# Patient Record
Sex: FEMALE | Race: BLACK OR AFRICAN AMERICAN | NOT HISPANIC OR LATINO | URBAN - METROPOLITAN AREA
[De-identification: names, ages, dates, MRNs, and addresses within clinical notes are randomized per-mention and may not be internally consistent; named-entity substitution may affect disease eponyms.]

---

## 2018-11-06 ENCOUNTER — INPATIENT (INPATIENT)
Facility: HOSPITAL | Age: 58
LOS: 10 days | Discharge: HOME | End: 2018-11-17
Attending: SURGERY | Admitting: SURGERY
Payer: MEDICAID

## 2018-11-06 VITALS
RESPIRATION RATE: 20 BRPM | OXYGEN SATURATION: 97 % | WEIGHT: 149.91 LBS | SYSTOLIC BLOOD PRESSURE: 144 MMHG | HEART RATE: 118 BPM | HEIGHT: 63 IN | TEMPERATURE: 99 F | DIASTOLIC BLOOD PRESSURE: 87 MMHG

## 2018-11-06 DIAGNOSIS — E78.5 HYPERLIPIDEMIA, UNSPECIFIED: ICD-10-CM

## 2018-11-06 DIAGNOSIS — I82.5Z2 CHRONIC EMBOLISM AND THROMBOSIS OF UNSPECIFIED DEEP VEINS OF LEFT DISTAL LOWER EXTREMITY: ICD-10-CM

## 2018-11-06 DIAGNOSIS — I10 ESSENTIAL (PRIMARY) HYPERTENSION: ICD-10-CM

## 2018-11-06 DIAGNOSIS — D72.828 OTHER ELEVATED WHITE BLOOD CELL COUNT: ICD-10-CM

## 2018-11-06 DIAGNOSIS — K56.609 UNSPECIFIED INTESTINAL OBSTRUCTION, UNSPECIFIED AS TO PARTIAL VERSUS COMPLETE OBSTRUCTION: ICD-10-CM

## 2018-11-06 DIAGNOSIS — R09.89 OTHER SPECIFIED SYMPTOMS AND SIGNS INVOLVING THE CIRCULATORY AND RESPIRATORY SYSTEMS: ICD-10-CM

## 2018-11-06 DIAGNOSIS — N17.9 ACUTE KIDNEY FAILURE, UNSPECIFIED: ICD-10-CM

## 2018-11-06 DIAGNOSIS — Z98.891 HISTORY OF UTERINE SCAR FROM PREVIOUS SURGERY: Chronic | ICD-10-CM

## 2018-11-06 LAB
ALBUMIN SERPL ELPH-MCNC: 3.6 G/DL — SIGNIFICANT CHANGE UP (ref 3.3–5)
ALP SERPL-CCNC: 80 U/L — SIGNIFICANT CHANGE UP (ref 40–120)
ALT FLD-CCNC: 21 U/L — SIGNIFICANT CHANGE UP (ref 12–78)
AMYLASE P1 CFR SERPL: 241 U/L — HIGH (ref 25–115)
ANION GAP SERPL CALC-SCNC: 15 MMOL/L — SIGNIFICANT CHANGE UP (ref 5–17)
APTT BLD: 27.8 SEC — LOW (ref 28.5–37)
AST SERPL-CCNC: 26 U/L — SIGNIFICANT CHANGE UP (ref 15–37)
BASOPHILS # BLD AUTO: 0.02 K/UL — SIGNIFICANT CHANGE UP (ref 0–0.2)
BASOPHILS NFR BLD AUTO: 0.2 % — SIGNIFICANT CHANGE UP (ref 0–2)
BILIRUB SERPL-MCNC: 0.8 MG/DL — SIGNIFICANT CHANGE UP (ref 0.2–1.2)
BUN SERPL-MCNC: 54 MG/DL — HIGH (ref 7–23)
CALCIUM SERPL-MCNC: 10 MG/DL — SIGNIFICANT CHANGE UP (ref 8.5–10.1)
CHLORIDE SERPL-SCNC: 79 MMOL/L — LOW (ref 96–108)
CO2 SERPL-SCNC: 36 MMOL/L — HIGH (ref 22–31)
CREAT SERPL-MCNC: 1.67 MG/DL — HIGH (ref 0.5–1.3)
EOSINOPHIL # BLD AUTO: 0.01 K/UL — SIGNIFICANT CHANGE UP (ref 0–0.5)
EOSINOPHIL NFR BLD AUTO: 0.1 % — SIGNIFICANT CHANGE UP (ref 0–6)
GLUCOSE SERPL-MCNC: 211 MG/DL — HIGH (ref 70–99)
HCG SERPL-ACNC: 2 MIU/ML — SIGNIFICANT CHANGE UP
HCT VFR BLD CALC: 44.1 % — SIGNIFICANT CHANGE UP (ref 34.5–45)
HGB BLD-MCNC: 14.6 G/DL — SIGNIFICANT CHANGE UP (ref 11.5–15.5)
IMM GRANULOCYTES NFR BLD AUTO: 0.3 % — SIGNIFICANT CHANGE UP (ref 0–1.5)
INR BLD: 1.22 RATIO — HIGH (ref 0.88–1.16)
LACTATE SERPL-SCNC: 1.3 MMOL/L — SIGNIFICANT CHANGE UP (ref 0.7–2)
LIDOCAIN IGE QN: 162 U/L — SIGNIFICANT CHANGE UP (ref 73–393)
LYMPHOCYTES # BLD AUTO: 1.41 K/UL — SIGNIFICANT CHANGE UP (ref 1–3.3)
LYMPHOCYTES # BLD AUTO: 11.8 % — LOW (ref 13–44)
MCHC RBC-ENTMCNC: 28.5 PG — SIGNIFICANT CHANGE UP (ref 27–34)
MCHC RBC-ENTMCNC: 33.1 GM/DL — SIGNIFICANT CHANGE UP (ref 32–36)
MCV RBC AUTO: 86.1 FL — SIGNIFICANT CHANGE UP (ref 80–100)
MONOCYTES # BLD AUTO: 0.82 K/UL — SIGNIFICANT CHANGE UP (ref 0–0.9)
MONOCYTES NFR BLD AUTO: 6.9 % — SIGNIFICANT CHANGE UP (ref 2–14)
NEUTROPHILS # BLD AUTO: 9.65 K/UL — HIGH (ref 1.8–7.4)
NEUTROPHILS NFR BLD AUTO: 80.7 % — HIGH (ref 43–77)
NRBC # BLD: 0 /100 WBCS — SIGNIFICANT CHANGE UP (ref 0–0)
PLATELET # BLD AUTO: 364 K/UL — SIGNIFICANT CHANGE UP (ref 150–400)
POTASSIUM SERPL-MCNC: 3.7 MMOL/L — SIGNIFICANT CHANGE UP (ref 3.5–5.3)
POTASSIUM SERPL-SCNC: 3.7 MMOL/L — SIGNIFICANT CHANGE UP (ref 3.5–5.3)
PROT SERPL-MCNC: 9 GM/DL — HIGH (ref 6–8.3)
PROTHROM AB SERPL-ACNC: 13.7 SEC — HIGH (ref 10–12.9)
RBC # BLD: 5.12 M/UL — SIGNIFICANT CHANGE UP (ref 3.8–5.2)
RBC # FLD: 13.6 % — SIGNIFICANT CHANGE UP (ref 10.3–14.5)
SODIUM SERPL-SCNC: 130 MMOL/L — LOW (ref 135–145)
WBC # BLD: 11.95 K/UL — HIGH (ref 3.8–10.5)
WBC # FLD AUTO: 11.95 K/UL — HIGH (ref 3.8–10.5)

## 2018-11-06 PROCEDURE — 74176 CT ABD & PELVIS W/O CONTRAST: CPT | Mod: 26

## 2018-11-06 PROCEDURE — 99285 EMERGENCY DEPT VISIT HI MDM: CPT | Mod: 25

## 2018-11-06 PROCEDURE — 93010 ELECTROCARDIOGRAM REPORT: CPT

## 2018-11-06 PROCEDURE — 71045 X-RAY EXAM CHEST 1 VIEW: CPT | Mod: 26

## 2018-11-06 PROCEDURE — 74018 RADEX ABDOMEN 1 VIEW: CPT | Mod: 26

## 2018-11-06 PROCEDURE — 43753 TX GASTRO INTUB W/ASP: CPT

## 2018-11-06 RX ORDER — MORPHINE SULFATE 50 MG/1
4 CAPSULE, EXTENDED RELEASE ORAL
Qty: 0 | Refills: 0 | Status: DISCONTINUED | OUTPATIENT
Start: 2018-11-06 | End: 2018-11-08

## 2018-11-06 RX ORDER — MORPHINE SULFATE 50 MG/1
2 CAPSULE, EXTENDED RELEASE ORAL EVERY 4 HOURS
Qty: 0 | Refills: 0 | Status: DISCONTINUED | OUTPATIENT
Start: 2018-11-06 | End: 2018-11-08

## 2018-11-06 RX ORDER — PIPERACILLIN AND TAZOBACTAM 4; .5 G/20ML; G/20ML
3.38 INJECTION, POWDER, LYOPHILIZED, FOR SOLUTION INTRAVENOUS EVERY 8 HOURS
Qty: 0 | Refills: 0 | Status: DISCONTINUED | OUTPATIENT
Start: 2018-11-06 | End: 2018-11-08

## 2018-11-06 RX ORDER — MORPHINE SULFATE 50 MG/1
4 CAPSULE, EXTENDED RELEASE ORAL ONCE
Qty: 0 | Refills: 0 | Status: DISCONTINUED | OUTPATIENT
Start: 2018-11-06 | End: 2018-11-06

## 2018-11-06 RX ORDER — PANTOPRAZOLE SODIUM 20 MG/1
40 TABLET, DELAYED RELEASE ORAL DAILY
Qty: 0 | Refills: 0 | Status: DISCONTINUED | OUTPATIENT
Start: 2018-11-06 | End: 2018-11-08

## 2018-11-06 RX ORDER — SODIUM CHLORIDE 9 MG/ML
1000 INJECTION INTRAMUSCULAR; INTRAVENOUS; SUBCUTANEOUS ONCE
Qty: 0 | Refills: 0 | Status: COMPLETED | OUTPATIENT
Start: 2018-11-06 | End: 2018-11-06

## 2018-11-06 RX ORDER — IOHEXOL 300 MG/ML
30 INJECTION, SOLUTION INTRAVENOUS ONCE
Qty: 0 | Refills: 0 | Status: COMPLETED | OUTPATIENT
Start: 2018-11-06 | End: 2018-11-06

## 2018-11-06 RX ORDER — AMLODIPINE BESYLATE 2.5 MG/1
10 TABLET ORAL DAILY
Qty: 0 | Refills: 0 | Status: DISCONTINUED | OUTPATIENT
Start: 2018-11-06 | End: 2018-11-08

## 2018-11-06 RX ORDER — PANTOPRAZOLE SODIUM 20 MG/1
40 TABLET, DELAYED RELEASE ORAL ONCE
Qty: 0 | Refills: 0 | Status: COMPLETED | OUTPATIENT
Start: 2018-11-06 | End: 2018-11-06

## 2018-11-06 RX ORDER — SODIUM CHLORIDE 9 MG/ML
1000 INJECTION, SOLUTION INTRAVENOUS
Qty: 0 | Refills: 0 | Status: DISCONTINUED | OUTPATIENT
Start: 2018-11-06 | End: 2018-11-07

## 2018-11-06 RX ORDER — AMLODIPINE BESYLATE 2.5 MG/1
1 TABLET ORAL
Qty: 0 | Refills: 0 | COMMUNITY

## 2018-11-06 RX ORDER — PIPERACILLIN AND TAZOBACTAM 4; .5 G/20ML; G/20ML
3.38 INJECTION, POWDER, LYOPHILIZED, FOR SOLUTION INTRAVENOUS ONCE
Qty: 0 | Refills: 0 | Status: COMPLETED | OUTPATIENT
Start: 2018-11-06 | End: 2018-11-06

## 2018-11-06 RX ORDER — ONDANSETRON 8 MG/1
4 TABLET, FILM COATED ORAL ONCE
Qty: 0 | Refills: 0 | Status: COMPLETED | OUTPATIENT
Start: 2018-11-06 | End: 2018-11-06

## 2018-11-06 RX ORDER — ACETAMINOPHEN 500 MG
650 TABLET ORAL EVERY 6 HOURS
Qty: 0 | Refills: 0 | Status: DISCONTINUED | OUTPATIENT
Start: 2018-11-06 | End: 2018-11-08

## 2018-11-06 RX ORDER — ACETAMINOPHEN 500 MG
1000 TABLET ORAL ONCE
Qty: 0 | Refills: 0 | Status: DISCONTINUED | OUTPATIENT
Start: 2018-11-06 | End: 2018-11-08

## 2018-11-06 RX ADMIN — IOHEXOL 30 MILLILITER(S): 300 INJECTION, SOLUTION INTRAVENOUS at 11:19

## 2018-11-06 RX ADMIN — SODIUM CHLORIDE 1000 MILLILITER(S): 9 INJECTION INTRAMUSCULAR; INTRAVENOUS; SUBCUTANEOUS at 11:18

## 2018-11-06 RX ADMIN — PANTOPRAZOLE SODIUM 40 MILLIGRAM(S): 20 TABLET, DELAYED RELEASE ORAL at 11:18

## 2018-11-06 RX ADMIN — PIPERACILLIN AND TAZOBACTAM 25 GRAM(S): 4; .5 INJECTION, POWDER, LYOPHILIZED, FOR SOLUTION INTRAVENOUS at 21:42

## 2018-11-06 RX ADMIN — MORPHINE SULFATE 4 MILLIGRAM(S): 50 CAPSULE, EXTENDED RELEASE ORAL at 11:45

## 2018-11-06 RX ADMIN — PIPERACILLIN AND TAZOBACTAM 200 GRAM(S): 4; .5 INJECTION, POWDER, LYOPHILIZED, FOR SOLUTION INTRAVENOUS at 18:30

## 2018-11-06 RX ADMIN — SODIUM CHLORIDE 1000 MILLILITER(S): 9 INJECTION INTRAMUSCULAR; INTRAVENOUS; SUBCUTANEOUS at 16:24

## 2018-11-06 RX ADMIN — MORPHINE SULFATE 4 MILLIGRAM(S): 50 CAPSULE, EXTENDED RELEASE ORAL at 11:18

## 2018-11-06 RX ADMIN — ONDANSETRON 4 MILLIGRAM(S): 8 TABLET, FILM COATED ORAL at 11:18

## 2018-11-06 RX ADMIN — SODIUM CHLORIDE 100 MILLILITER(S): 9 INJECTION, SOLUTION INTRAVENOUS at 18:30

## 2018-11-06 NOTE — H&P ADULT - HISTORY OF PRESENT ILLNESS
59YO female presents c/o worsening abdominal pain over the last 4-5 days. This has been accompanied with an increase in the size of her abdomen. Pain has been to 4-5/10 in intensity. She said she has vomited "multiple times". She said the fluid was green.    H/O  27 years ago.    No similar episodes in the past.    Pt is in the  on an extended vacation from [place].

## 2018-11-06 NOTE — H&P ADULT - ASSESSMENT
57 yo female admitted with:  SBO  DM  H/O DVT L LE  H/O HTN  Leukocytosis  BEBE from persistent vomiting  lactate level?

## 2018-11-06 NOTE — H&P ADULT - NSHPPHYSICALEXAM_GEN_ALL_CORE
Head normocephalic. Wearing a very big "weave"  ENT WNL  Cardiac nl regular rhythm, no murmur  Lungs clear  Abd  moderately tensely distended. increased tenderness suprapubically in the midline. No palpable masses or hernias. Surgical scar appears well healed. No defects noted.   deferred  GI WNL  Extremities : no tenderness or swelling in left LE [site of prior DVT]

## 2018-11-06 NOTE — PATIENT PROFILE ADULT - DOES PATIENT HAVE ADVANCE DIRECTIVE
[birth year]        [address]  [address]      Dear Jaime Hamilton,    [facility] records indicate that you have outstanding lab work and or testing that was ordered for you and has not yet been completed:  Orders Placed This Encounter
no

## 2018-11-06 NOTE — H&P ADULT - NSHPLABSRESULTS_GEN_ALL_CORE
14.6   11.95 )-----------( 364      ( 06 Nov 2018 11:26 )             44.1   11-06    130<L>  |  79<L>  |  54<H>  ----------------------------<  211<H>  3.7   |  36<H>  |  1.67<H>    Ca    10.0      06 Nov 2018 11:26      TPro  9.0<H>  /  Alb  3.6  /  TBili  0.8  /  DBili  x   /  AST  26  /  ALT  21  /  AlkPhos  80  11-06  PT/INR - ( 06 Nov 2018 11:26 )   PT: 13.7 sec;   INR: 1.22 ratio    PTT - ( 06 Nov 2018 11:26 )  PTT:27.8 sec    Amylase, Serum Total (11.06.18 @ 11:26)    Amylase, Serum Total: 241 U/L      < from: CT Abdomen and Pelvis w/ Oral Cont (11.06.18 @ 12:54) >    EXAM:  CT ABDOMEN AND PELVIS OC                            PROCEDURE DATE:  11/06/2018          INTERPRETATION:  CT of the abdomen and pelvis without IV contrast    Indication: Left upper and lower abdominal pain, nausea and vomiting.    Technique:Axial multidetector CT images of the abdomen are acquired   following the administration of oral contrast only.    Comparison: None.    Findings:    Limited sections through the lung bases demonstrate apparent mild mural   thickening of the distal esophagus. Mild right basilar atelectasis. Mild   airspace opacification versus atelectasis in the left lower lobe. Small   atelectasis in the lingula.    Examination of the abdomen and pelvis is limited by lack of IV contrast.   Hepatic steatosis. Allowing for the non-IV contrast technique, the   pancreas, spleen, adrenals and kidneys appear grossly unremarkable. No   evidence for a calculus in the kidneys or ureters. No hydronephrosis. The   common bile duct is nondilated.    The appendix is not visualized. There is dilatation of the small bowel.   The terminal ileum and colon are collapsed. Findings are suggestive of a   small bowel obstruction. There is a mild mesenteric ascites for which   associated small bowel ischemia cannot be excluded.    No evidence for free air, or enlarged lymph node.    The urinary bladder and uterus appear grossly unremarkable.    Impression: Findings are suggestive of small bowel obstruction. Mild   mesenteric ascites for which associated small bowel ischemiacannot be   excluded.    Mild airspace opacification versus atelectasis in the left lower lobe.    Apparent mild distal esophageal mural thickening. EGD may be pursued for   further evaluation.    Hepatic steatosis.

## 2018-11-06 NOTE — ED ADULT NURSE NOTE - NSIMPLEMENTINTERV_GEN_ALL_ED
Implemented All Universal Safety Interventions:  [place] to call system. Call bell, personal items and telephone within reach. Instruct patient to call for assistance. Room bathroom lighting operational. Non-slip footwear when patient is off stretcher. Physically safe environment: no spills, clutter or unnecessary equipment. Stretcher in lowest position, wheels locked, appropriate side rails in place.

## 2018-11-06 NOTE — ED PROVIDER NOTE - OBJECTIVE STATEMENT
58 years old female by ems c/o 4 days of epigastric and left lower abd pain nausea, vomiting. Pt denies headache, dizziness, blurred visions, light sensitivities, focal/distal weakness or numbness, cough, sob, chest pain, fever, chills, dysuria or irregular bowel movements. Pt sts she takes coumadin for hx of left leg dvt for 10 years. 58 years old female by ems c/o 4 days of epigastric and left lower abd pain nausea, vomiting and unable to tolerate orally and take her medications. Pt denies headache, dizziness, blurred visions, light sensitivities, focal/distal weakness or numbness, cough, sob, chest pain, fever, chills, dysuria or irregular bowel movements. Pt sts she takes coumadin for hx of left leg dvt for 10 years. Pt sts she has a hx of .

## 2018-11-06 NOTE — ED ADULT NURSE NOTE - OBJECTIVE STATEMENT
Pt c/o of diffused abdominal apin, nause, vomiting and weakness  since last friday. Denies any diarrhea

## 2018-11-06 NOTE — ED PROVIDER NOTE - EYES, MLM
Clear bilaterally, pupils equal, round and reactive to light. No photophobia no sclera icterus bilaterally

## 2018-11-06 NOTE — H&P ADULT - NSHPSOCIALHISTORY_GEN_ALL_CORE
Single  Denies use of cigarettes, alcohol & recreational drugs  Visitor from [place].  Multiple sexual partners.

## 2018-11-06 NOTE — ED PROVIDER NOTE - CONSTITUTIONAL, MLM
normal... Well appearing, well nourished, awake, alert, oriented to person, place, time/situation and in no apparent distress. Speaking in clear full sentences no nasal flaring no shoulders retractions not holding her chest/abdomen, appears very comfortable

## 2018-11-06 NOTE — H&P ADULT - NSHPREVIEWOFSYSTEMS_GEN_ALL_CORE
H/O of DVT in left leg approximately 5 years ago. Has been on Coumadin since. Has not had any Coumadin since vomiting started.    See CC HPI

## 2018-11-06 NOTE — ED ADULT TRIAGE NOTE - CHIEF COMPLAINT QUOTE
ems states, " abdominal pain , vomiting , weakness, and not taking bp meds for 5 days, radiating to the back  "

## 2018-11-07 DIAGNOSIS — K22.9 DISEASE OF ESOPHAGUS, UNSPECIFIED: ICD-10-CM

## 2018-11-07 LAB
ALBUMIN SERPL ELPH-MCNC: 2.9 G/DL — LOW (ref 3.3–5)
ALP SERPL-CCNC: 65 U/L — SIGNIFICANT CHANGE UP (ref 40–120)
ALT FLD-CCNC: 18 U/L — SIGNIFICANT CHANGE UP (ref 12–78)
AMYLASE P1 CFR SERPL: 114 U/L — SIGNIFICANT CHANGE UP (ref 25–115)
ANION GAP SERPL CALC-SCNC: 10 MMOL/L — SIGNIFICANT CHANGE UP (ref 5–17)
APPEARANCE UR: ABNORMAL
AST SERPL-CCNC: 13 U/L — LOW (ref 15–37)
BACTERIA # UR AUTO: ABNORMAL
BILIRUB SERPL-MCNC: 0.8 MG/DL — SIGNIFICANT CHANGE UP (ref 0.2–1.2)
BILIRUB UR-MCNC: NEGATIVE — SIGNIFICANT CHANGE UP
BUN SERPL-MCNC: 25 MG/DL — HIGH (ref 7–23)
CALCIUM SERPL-MCNC: 8.7 MG/DL — SIGNIFICANT CHANGE UP (ref 8.5–10.1)
CHLORIDE SERPL-SCNC: 88 MMOL/L — LOW (ref 96–108)
CO2 SERPL-SCNC: 37 MMOL/L — HIGH (ref 22–31)
COLOR SPEC: YELLOW — SIGNIFICANT CHANGE UP
COMMENT - URINE: SIGNIFICANT CHANGE UP
CREAT SERPL-MCNC: 1.02 MG/DL — SIGNIFICANT CHANGE UP (ref 0.5–1.3)
DIFF PNL FLD: ABNORMAL
EPI CELLS # UR: ABNORMAL
GLUCOSE SERPL-MCNC: 141 MG/DL — HIGH (ref 70–99)
GLUCOSE UR QL: NEGATIVE MG/DL — SIGNIFICANT CHANGE UP
HCT VFR BLD CALC: 37.3 % — SIGNIFICANT CHANGE UP (ref 34.5–45)
HGB BLD-MCNC: 12.1 G/DL — SIGNIFICANT CHANGE UP (ref 11.5–15.5)
KETONES UR-MCNC: ABNORMAL
LEUKOCYTE ESTERASE UR-ACNC: NEGATIVE — SIGNIFICANT CHANGE UP
MAGNESIUM SERPL-MCNC: 3 MG/DL — HIGH (ref 1.6–2.6)
MCHC RBC-ENTMCNC: 28.9 PG — SIGNIFICANT CHANGE UP (ref 27–34)
MCHC RBC-ENTMCNC: 32.4 GM/DL — SIGNIFICANT CHANGE UP (ref 32–36)
MCV RBC AUTO: 89.2 FL — SIGNIFICANT CHANGE UP (ref 80–100)
NITRITE UR-MCNC: NEGATIVE — SIGNIFICANT CHANGE UP
NRBC # BLD: 0 /100 WBCS — SIGNIFICANT CHANGE UP (ref 0–0)
PH UR: 6 — SIGNIFICANT CHANGE UP (ref 5–8)
PHOSPHATE SERPL-MCNC: 2.6 MG/DL — SIGNIFICANT CHANGE UP (ref 2.5–4.5)
PLATELET # BLD AUTO: 295 K/UL — SIGNIFICANT CHANGE UP (ref 150–400)
POTASSIUM SERPL-MCNC: 3.1 MMOL/L — LOW (ref 3.5–5.3)
POTASSIUM SERPL-SCNC: 3.1 MMOL/L — LOW (ref 3.5–5.3)
PROT SERPL-MCNC: 7 GM/DL — SIGNIFICANT CHANGE UP (ref 6–8.3)
PROT UR-MCNC: 30 MG/DL
RBC # BLD: 4.18 M/UL — SIGNIFICANT CHANGE UP (ref 3.8–5.2)
RBC # FLD: 13.9 % — SIGNIFICANT CHANGE UP (ref 10.3–14.5)
RBC CASTS # UR COMP ASSIST: ABNORMAL /HPF (ref 0–4)
SODIUM SERPL-SCNC: 135 MMOL/L — SIGNIFICANT CHANGE UP (ref 135–145)
SP GR SPEC: 1.01 — SIGNIFICANT CHANGE UP (ref 1.01–1.02)
UROBILINOGEN FLD QL: NEGATIVE MG/DL — SIGNIFICANT CHANGE UP
WBC # BLD: 9.86 K/UL — SIGNIFICANT CHANGE UP (ref 3.8–10.5)
WBC # FLD AUTO: 9.86 K/UL — SIGNIFICANT CHANGE UP (ref 3.8–10.5)
WBC UR QL: SIGNIFICANT CHANGE UP

## 2018-11-07 PROCEDURE — 74019 RADEX ABDOMEN 2 VIEWS: CPT | Mod: 26

## 2018-11-07 RX ORDER — POTASSIUM CHLORIDE 20 MEQ
10 PACKET (EA) ORAL
Qty: 0 | Refills: 0 | Status: COMPLETED | OUTPATIENT
Start: 2018-11-07 | End: 2018-11-07

## 2018-11-07 RX ORDER — POTASSIUM CHLORIDE 20 MEQ
10 PACKET (EA) ORAL ONCE
Qty: 0 | Refills: 0 | Status: DISCONTINUED | OUTPATIENT
Start: 2018-11-07 | End: 2018-11-07

## 2018-11-07 RX ORDER — HEPARIN SODIUM 5000 [USP'U]/ML
5000 INJECTION INTRAVENOUS; SUBCUTANEOUS EVERY 8 HOURS
Qty: 0 | Refills: 0 | Status: DISCONTINUED | OUTPATIENT
Start: 2018-11-07 | End: 2018-11-08

## 2018-11-07 RX ORDER — METOPROLOL TARTRATE 50 MG
5 TABLET ORAL EVERY 6 HOURS
Qty: 0 | Refills: 0 | Status: DISCONTINUED | OUTPATIENT
Start: 2018-11-07 | End: 2018-11-08

## 2018-11-07 RX ORDER — DEXTROSE MONOHYDRATE, SODIUM CHLORIDE, AND POTASSIUM CHLORIDE 50; .745; 4.5 G/1000ML; G/1000ML; G/1000ML
1000 INJECTION, SOLUTION INTRAVENOUS
Qty: 0 | Refills: 0 | Status: DISCONTINUED | OUTPATIENT
Start: 2018-11-07 | End: 2018-11-08

## 2018-11-07 RX ORDER — ONDANSETRON 8 MG/1
4 TABLET, FILM COATED ORAL EVERY 8 HOURS
Qty: 0 | Refills: 0 | Status: DISCONTINUED | OUTPATIENT
Start: 2018-11-07 | End: 2018-11-08

## 2018-11-07 RX ADMIN — AMLODIPINE BESYLATE 10 MILLIGRAM(S): 2.5 TABLET ORAL at 05:26

## 2018-11-07 RX ADMIN — Medication 100 MILLIEQUIVALENT(S): at 12:51

## 2018-11-07 RX ADMIN — HEPARIN SODIUM 5000 UNIT(S): 5000 INJECTION INTRAVENOUS; SUBCUTANEOUS at 14:49

## 2018-11-07 RX ADMIN — Medication 100 MILLIEQUIVALENT(S): at 22:51

## 2018-11-07 RX ADMIN — Medication 100 MILLIEQUIVALENT(S): at 14:48

## 2018-11-07 RX ADMIN — DEXTROSE MONOHYDRATE, SODIUM CHLORIDE, AND POTASSIUM CHLORIDE 85 MILLILITER(S): 50; .745; 4.5 INJECTION, SOLUTION INTRAVENOUS at 12:50

## 2018-11-07 RX ADMIN — DEXTROSE MONOHYDRATE, SODIUM CHLORIDE, AND POTASSIUM CHLORIDE 85 MILLILITER(S): 50; .745; 4.5 INJECTION, SOLUTION INTRAVENOUS at 22:51

## 2018-11-07 RX ADMIN — ONDANSETRON 4 MILLIGRAM(S): 8 TABLET, FILM COATED ORAL at 20:32

## 2018-11-07 RX ADMIN — HEPARIN SODIUM 5000 UNIT(S): 5000 INJECTION INTRAVENOUS; SUBCUTANEOUS at 22:50

## 2018-11-07 RX ADMIN — PANTOPRAZOLE SODIUM 40 MILLIGRAM(S): 20 TABLET, DELAYED RELEASE ORAL at 13:18

## 2018-11-07 RX ADMIN — PIPERACILLIN AND TAZOBACTAM 25 GRAM(S): 4; .5 INJECTION, POWDER, LYOPHILIZED, FOR SOLUTION INTRAVENOUS at 22:51

## 2018-11-07 RX ADMIN — PIPERACILLIN AND TAZOBACTAM 25 GRAM(S): 4; .5 INJECTION, POWDER, LYOPHILIZED, FOR SOLUTION INTRAVENOUS at 05:26

## 2018-11-07 RX ADMIN — PIPERACILLIN AND TAZOBACTAM 25 GRAM(S): 4; .5 INJECTION, POWDER, LYOPHILIZED, FOR SOLUTION INTRAVENOUS at 14:49

## 2018-11-07 NOTE — CONSULT NOTE ADULT - SUBJECTIVE AND OBJECTIVE BOX
HPI:  57YO female presents c/o worsening abdominal pain over the last 4-5 days. This has been accompanied with an increase in the size of her abdomen. Pain has been to 4-5/10 in intensity. She said she has vomited "multiple times". She said the fluid was green.    H/O  27 years ago.    No similar episodes in the past.    Pt is in the  on an extended vacation from [place]. (2018 16:08)      PAST MEDICAL & SURGICAL HISTORY:  HLD (hyperlipidemia)  HTN (hypertension)  DVT (deep venous thrombosis)  History of       REVIEW OF SYSTEMS:    CONSTITUTIONAL: No fever, weight loss, or fatigue  EYES: No eye pain, visual disturbances, or discharge  ENMT:  No difficulty hearing, tinnitus, vertigo; No sinus or throat pain  NECK: No pain or stiffness  BREASTS: No pain, masses, or nipple discharge  RESPIRATORY: No cough, wheezing, chills or hemoptysis; No shortness of breath  CARDIOVASCULAR: No chest pain, palpitations, dizziness, or leg swelling  GASTROINTESTINAL: No abdominal or epigastric pain. No nausea, vomiting, or hematemesis; No diarrhea or constipation. No melena or hematochezia.  GENITOURINARY: No dysuria, frequency, hematuria, or incontinence  NEUROLOGICAL: No headaches, memory loss, loss of strength, numbness, or tremors  SKIN: No itching, burning, rashes, or lesions   LYMPH NODES: No enlarged glands  ENDOCRINE: No heat or cold intolerance; No hair loss  MUSCULOSKELETAL: No joint pain or swelling; No muscle, back, or extremity pain  PSYCHIATRIC: No depression, anxiety, mood swings, or difficulty sleeping  HEME/LYMPH: No easy bruising, or bleeding gums  ALLERY AND IMMUNOLOGIC: No hives or eczema      MEDICATIONS  (STANDING):  amLODIPine   Tablet 10 milliGRAM(s) Oral daily  heparin  Injectable 5000 Unit(s) SubCutaneous every 8 hours  pantoprazole  Injectable 40 milliGRAM(s) IV Push daily  piperacillin/tazobactam IVPB. 3.375 Gram(s) IV Intermittent every 8 hours  potassium chloride  10 mEq/100 mL IVPB 10 milliEquivalent(s) IV Intermittent every 1 hour  sodium chloride 0.9% with potassium chloride 20 mEq/L 1000 milliLiter(s) (85 mL/Hr) IV Continuous <Continuous>    MEDICATIONS  (PRN):  acetaminophen   Tablet .. 650 milliGRAM(s) Oral every 6 hours PRN Temp greater or equal to 38C (100.4F), Mild Pain (1 - 3)  acetaminophen  IVPB .. 1000 milliGRAM(s) IV Intermittent once PRN Mild Pain (1 - 3)  morphine  - Injectable 4 milliGRAM(s) IV Push four times a day PRN Severe Pain (7 - 10)  morphine  - Injectable 2 milliGRAM(s) IV Push every 4 hours PRN Moderate Pain (4 - 6)      Allergies    No Known Allergies    Intolerances        SOCIAL HISTORY:non contributory. visiting from [place]    FAMILY HISTORY:non contributory      Vital Signs Last 24 Hrs  T(C): 37.4 (2018 11:18), Max: 37.8 (2018 00:17)  T(F): 99.4 (2018 11:18), Max: 100 (2018 00:17)  HR: 91 (2018 11:18) (91 - 102)  BP: 153/90 (2018 11:18) (135/77 - 153/90)  BP(mean): --  RR: 16 (2018 11:18) (16 - 16)  SpO2: 97% (2018 11:18) (96% - 97%)    PHYSICAL EXAM:    GENERAL: NAD, well-groomed, well-developed  HEAD:  Atraumatic, Normocephalic  EYES: EOMI, PERRLA, conjunctiva and sclera clear  ENMT: No tonsillar erythema, exudates, or enlargement; Moist mucous membranes, Good dentition, No lesions  NECK: Supple, No JVD, Normal thyroid  NERVOUS SYSTEM:  Alert & Oriented X3, Good concentration; Motor Strength 5/5 B/L upper and lower extremities; DTRs 2+ intact and symmetric  CHEST/LUNG: Clear to percussion bilaterally; No rales, rhonchi, wheezing, or rubs  HEART: Regular rate and rhythm; No murmurs, rubs, or gallops  ABDOMEN: Soft, Nontender, distended; Bowel sounds present. NG tube with bilious drainage  EXTREMITIES:  2+ Peripheral Pulses, No clubbing, cyanosis, or edema  LYMPH: No lymphadenopathy notes  SKIN: No rashes or lesions      LABS:                        12.1   9.86  )-----------( 295      ( 2018 07:52 )             37.3     11-07    135  |  88<L>  |  25<H>  ----------------------------<  141<H>  3.1<L>   |  37<H>  |  1.02    Ca    8.7      2018 07:39  Phos  2.6       Mg     3.0         TPro  7.0  /  Alb  2.9<L>  /  TBili  0.8  /  DBili  x   /  AST  13<L>  /  ALT  18  /  AlkPhos  65      PT/INR - ( 2018 11:26 )   PT: 13.7 sec;   INR: 1.22 ratio         PTT - ( 2018 11:26 )  PTT:27.8 sec    EKG sinus rhythm. NAC  RADIOLOGY & ADDITIONAL STUDIES:  < from: CT Abdomen and Pelvis w/ Oral Cont (18 @ 12:54) >    EXAM:  CT ABDOMEN AND PELVIS OC                            PROCEDURE DATE:  2018          INTERPRETATION:  CT of the abdomen and pelvis without IV contrast    Indication: Left upper and lower abdominal pain, nausea and vomiting.    Technique:Axial multidetector CT images of the abdomen are acquired   following the administration of oral contrast only.    Comparison: None.    Findings:    Limited sections through the lung bases demonstrate apparent mild mural   thickening of the distal esophagus. Mild right basilar atelectasis. Mild   airspace opacification versus atelectasis in the left lower lobe. Small   atelectasis in the lingula.    Examination of the abdomen and pelvis is limited by lack of IV contrast.   Hepatic steatosis. Allowing for the non-IV contrast technique, the   pancreas, spleen, adrenals and kidneys appear grossly unremarkable. No   evidence for a calculus in the kidneys or ureters. No hydronephrosis. The   common bile duct is nondilated.    The appendix is not visualized. There is dilatation of the small bowel.   The terminal ileum and colon are collapsed. Findings are suggestive of a   small bowel obstruction. There is a mild mesenteric ascites for which   associated small bowel ischemia cannot be excluded.    No evidence for free air, or enlarged lymph node.    The urinary bladder and uterus appear grossly unremarkable.    Impression: Findings are suggestive of small bowel obstruction. Mild   mesenteric ascites for which associated small bowel ischemiacannot be   excluded.    Mild airspace opacification versus atelectasis in the left lower lobe.    Apparent mild distal esophageal mural thickening. EGD may be pursued for   further evaluation.    Hepatic steatosis.    < end of copied text > HPI:  59YO female presents c/o worsening abdominal pain over the last 4-5 days. This has been accompanied with an increase in the size of her abdomen. Pain has been to 4-5/10 in intensity. She said she has vomited "multiple times". She said the fluid was green.    H/O  27 years ago.    No similar episodes in the past.    Pt is in the  on an extended vacation from [place]. (2018 16:08)      PAST MEDICAL & SURGICAL HISTORY:  HLD (hyperlipidemia)  HTN (hypertension)  DVT (deep venous thrombosis)  History of       REVIEW OF SYSTEMS:    CONSTITUTIONAL: No fever, weight loss, or fatigue  EYES: No eye pain, visual disturbances, or discharge  ENMT:  No difficulty hearing, tinnitus, vertigo; No sinus or throat pain  NECK: No pain or stiffness  BREASTS: No pain, masses, or nipple discharge  RESPIRATORY: No cough, wheezing, chills or hemoptysis; No shortness of breath  CARDIOVASCULAR: No chest pain, palpitations, dizziness, or leg swelling  GASTROINTESTINAL: No abdominal or epigastric pain. No nausea, vomiting, or hematemesis; No diarrhea or constipation. No melena or hematochezia.  GENITOURINARY: No dysuria, frequency, hematuria, or incontinence  NEUROLOGICAL: No headaches, memory loss, loss of strength, numbness, or tremors  SKIN: No itching, burning, rashes, or lesions   LYMPH NODES: No enlarged glands  ENDOCRINE: No heat or cold intolerance; No hair loss  MUSCULOSKELETAL: No joint pain or swelling; No muscle, back, or extremity pain  PSYCHIATRIC: No depression, anxiety, mood swings, or difficulty sleeping  HEME/LYMPH: No easy bruising, or bleeding gums  ALLERY AND IMMUNOLOGIC: No hives or eczema      MEDICATIONS  (STANDING):  amLODIPine   Tablet 10 milliGRAM(s) Oral daily  heparin  Injectable 5000 Unit(s) SubCutaneous every 8 hours  pantoprazole  Injectable 40 milliGRAM(s) IV Push daily  piperacillin/tazobactam IVPB. 3.375 Gram(s) IV Intermittent every 8 hours  potassium chloride  10 mEq/100 mL IVPB 10 milliEquivalent(s) IV Intermittent every 1 hour  sodium chloride 0.9% with potassium chloride 20 mEq/L 1000 milliLiter(s) (85 mL/Hr) IV Continuous <Continuous>    MEDICATIONS  (PRN):  acetaminophen   Tablet .. 650 milliGRAM(s) Oral every 6 hours PRN Temp greater or equal to 38C (100.4F), Mild Pain (1 - 3)  acetaminophen  IVPB .. 1000 milliGRAM(s) IV Intermittent once PRN Mild Pain (1 - 3)  morphine  - Injectable 4 milliGRAM(s) IV Push four times a day PRN Severe Pain (7 - 10)  morphine  - Injectable 2 milliGRAM(s) IV Push every 4 hours PRN Moderate Pain (4 - 6)      Allergies    No Known Allergies    Intolerances        SOCIAL HISTORY:non contributory. visiting from [place]    FAMILY HISTORY:non contributory      Vital Signs Last 24 Hrs  T(C): 37.4 (2018 11:18), Max: 37.8 (2018 00:17)  T(F): 99.4 (2018 11:18), Max: 100 (2018 00:17)  HR: 91 (2018 11:18) (91 - 102)  BP: 153/90 (2018 11:18) (135/77 - 153/90)  BP(mean): --  RR: 16 (2018 11:18) (16 - 16)  SpO2: 97% (2018 11:18) (96% - 97%)    PHYSICAL EXAM:    GENERAL: NAD, well-groomed, well-developed  HEAD:  Atraumatic, Normocephalic  EYES: EOMI, PERRLA, conjunctiva and sclera clear  ENMT: No tonsillar erythema, exudates, or enlargement; Moist mucous membranes, Good dentition, No lesions  NECK: Supple, No JVD, Normal thyroid  NERVOUS SYSTEM:  Alert & Oriented X3, Good concentration; Motor Strength 5/5 B/L upper and lower extremities; DTRs 2+ intact and symmetric  CHEST/LUNG: Clear to percussion bilaterally; No rales, rhonchi, wheezing, or rubs  HEART: Regular rate and rhythm; No murmurs, rubs, or gallops  ABDOMEN: Soft, Nontender, distended; Bowel sounds present. NG tube with bilious drainage  EXTREMITIES:  2+ Peripheral Pulses, No clubbing, cyanosis, or edema  LYMPH: No lymphadenopathy notes  SKIN: No rashes or lesions      LABS:                        12.1   9.86  )-----------( 295      ( 2018 07:52 )             37.3     11-07    135  |  88<L>  |  25<H>  ----------------------------<  141<H>  3.1<L>   |  37<H>  |  1.02    Ca    8.7      2018 07:39  Phos  2.6       Mg     3.0         TPro  7.0  /  Alb  2.9<L>  /  TBili  0.8  /  DBili  x   /  AST  13<L>  /  ALT  18  /  AlkPhos  65      PT/INR - ( 2018 11:26 )   PT: 13.7 sec;   INR: 1.22 ratio         PTT - ( 2018 11:26 )  PTT:27.8 sec    EKG sinus rhythm. NAC. LVH  RADIOLOGY & ADDITIONAL STUDIES:  < from: CT Abdomen and Pelvis w/ Oral Cont (18 @ 12:54) >    EXAM:  CT ABDOMEN AND PELVIS OC                            PROCEDURE DATE:  2018          INTERPRETATION:  CT of the abdomen and pelvis without IV contrast    Indication: Left upper and lower abdominal pain, nausea and vomiting.    Technique:Axial multidetector CT images of the abdomen are acquired   following the administration of oral contrast only.    Comparison: None.    Findings:    Limited sections through the lung bases demonstrate apparent mild mural   thickening of the distal esophagus. Mild right basilar atelectasis. Mild   airspace opacification versus atelectasis in the left lower lobe. Small   atelectasis in the lingula.    Examination of the abdomen and pelvis is limited by lack of IV contrast.   Hepatic steatosis. Allowing for the non-IV contrast technique, the   pancreas, spleen, adrenals and kidneys appear grossly unremarkable. No   evidence for a calculus in the kidneys or ureters. No hydronephrosis. The   common bile duct is nondilated.    The appendix is not visualized. There is dilatation of the small bowel.   The terminal ileum and colon are collapsed. Findings are suggestive of a   small bowel obstruction. There is a mild mesenteric ascites for which   associated small bowel ischemia cannot be excluded.    No evidence for free air, or enlarged lymph node.    The urinary bladder and uterus appear grossly unremarkable.    Impression: Findings are suggestive of small bowel obstruction. Mild   mesenteric ascites for which associated small bowel ischemiacannot be   excluded.    Mild airspace opacification versus atelectasis in the left lower lobe.    Apparent mild distal esophageal mural thickening. EGD may be pursued for   further evaluation.    Hepatic steatosis.    < end of copied text >

## 2018-11-07 NOTE — PROGRESS NOTE ADULT - SUBJECTIVE AND OBJECTIVE BOX
Patient seen and examined bedside resting comfortably.  States that her stomach appears less tense to he.  No Flatus/BM.  Denies chest pain, dyspnea, cough.    T(F): 99.2 (11-07-18 @ 05:36), Max: 100 (11-07-18 @ 00:17)  HR: 92 (11-07-18 @ 05:36) (90 - 118)  BP: 145/82 (11-07-18 @ 05:36) (131/91 - 156/92)  RR: 16 (11-07-18 @ 05:36) (16 - 20)  SpO2: 96% (11-07-18 @ 05:36) (96% - 97%)    PHYSICAL EXAM:  General: NAD  Neuro:  Alert & oriented x 3  Abdomen: softer than yesterday., No tenderness. No BS. NGT in place: draining dark bilious material. 500 ml on insertion, 500 ml in canister now. Patency of NGT ensured.             LABS:                        12.1   9.86  )-----------( 295      ( 07 Nov 2018 07:52 )             37.3     11-07    135  |  88<L>  |  25<H>  ----------------------------<  141<H>  3.1<L>   |  37<H>  |  1.02    Ca    8.7      07 Nov 2018 07:39  Phos  2.6     11-07  Mg     3.0     11-07    TPro  7.0  /  Alb  2.9<L>  /  TBili  0.8  /  DBili  x   /  AST  13<L>  /  ALT  18  /  AlkPhos  65  11-07    PT/INR - ( 06 Nov 2018 11:26 )   PT: 13.7 sec;   INR: 1.22 ratio    PTT - ( 06 Nov 2018 11:26 )  PTT:27.8 sec    < from: Xray Abdomen 2 Views (11.07.18 @ 09:28) >  EXAM:  XR ABDOMEN 2V                            PROCEDURE DATE:  11/07/2018          INTERPRETATION:    DATE OF EXAM: 11/7/18.    COMPARISON: 11/6/18 CT scan of the abdomen and pelvis.    CLINICAL INDICATION: 57-yo-female with SBO.    TECHNIQUE: Flat and erect abdominal films - 2 films.    FINDINGS:    Left basilar atelectatic changes.  NG tube in stomach.  Multiple gas-distended small bowel loops with stepladder type air-fluid   levels are seen  - consistent with persistent small bowel obstruction.  No free intraperitoneal air is noted.  The bony structures are intact.    IMPRESSION:   Persistent small bowel obstruction.            I&O's Detail    06 Nov 2018 07:01  -  07 Nov 2018 07:00  --------------------------------------------------------  IN:    lactated ringers.: 1200 mL    Solution: 275 mL  Total IN: 1475 mL    OUT:    Nasoenteral Tube: 500 mL  Total OUT: 500 mL    Total NET: 975 mL      07 Nov 2018 07:01  -  07 Nov 2018 10:23  --------------------------------------------------------  IN:  Total IN: 0 mL    OUT:    Voided: 500 mL  Total OUT: 500 mL    Total NET: -500 mL          Impression: 58y Female admitted with SMALL BOWEL OBSTRUCTION  hypokalemia  improved BUN with hydration  h/o DVT left LE 5 years ago      Plan:  -VTE prophylaxis   - continue IVAB  -Increase activity , OOB, Ambulate in attempt to stimulate bowel function  -f/u AM labs  -will discuss with Dr. Varma Patient seen and examined bedside resting comfortably.  States that her stomach appears less tense to he.  No Flatus/BM.  Denies chest pain, dyspnea, cough.    T(F): 99.2 (11-07-18 @ 05:36), Max: 100 (11-07-18 @ 00:17)  HR: 92 (11-07-18 @ 05:36) (90 - 118)  BP: 145/82 (11-07-18 @ 05:36) (131/91 - 156/92)  RR: 16 (11-07-18 @ 05:36) (16 - 20)  SpO2: 96% (11-07-18 @ 05:36) (96% - 97%)    PHYSICAL EXAM:  General: NAD  Neuro:  Alert & oriented x 3  Abdomen: softer than yesterday., No tenderness. No BS. NGT in place: draining dark bilious material. 500 ml on insertion, 500 ml in canister now. Patency of NGT ensured.             LABS:                        12.1   9.86  )-----------( 295      ( 07 Nov 2018 07:52 )             37.3     11-07    135  |  88<L>  |  25<H>  ----------------------------<  141<H>  3.1<L>   |  37<H>  |  1.02    Ca    8.7      07 Nov 2018 07:39  Phos  2.6     11-07  Mg     3.0     11-07    TPro  7.0  /  Alb  2.9<L>  /  TBili  0.8  /  DBili  x   /  AST  13<L>  /  ALT  18  /  AlkPhos  65  11-07    PT/INR - ( 06 Nov 2018 11:26 )   PT: 13.7 sec;   INR: 1.22 ratio    PTT - ( 06 Nov 2018 11:26 )  PTT:27.8 sec    < from: Xray Abdomen 2 Views (11.07.18 @ 09:28) >  EXAM:  XR ABDOMEN 2V                            PROCEDURE DATE:  11/07/2018          INTERPRETATION:    DATE OF EXAM: 11/7/18.    COMPARISON: 11/6/18 CT scan of the abdomen and pelvis.    CLINICAL INDICATION: 57-yo-female with SBO.    TECHNIQUE: Flat and erect abdominal films - 2 films.    FINDINGS:    Left basilar atelectatic changes.  NG tube in stomach.  Multiple gas-distended small bowel loops with stepladder type air-fluid   levels are seen  - consistent with persistent small bowel obstruction.  No free intraperitoneal air is noted.  The bony structures are intact.    IMPRESSION:   Persistent small bowel obstruction.            I&O's Detail    06 Nov 2018 07:01  -  07 Nov 2018 07:00  --------------------------------------------------------  IN:    lactated ringers.: 1200 mL    Solution: 275 mL  Total IN: 1475 mL    OUT:    Nasoenteral Tube: 500 mL  Total OUT: 500 mL    Total NET: 975 mL      07 Nov 2018 07:01  -  07 Nov 2018 10:23  --------------------------------------------------------  IN:  Total IN: 0 mL    OUT:    Voided: 500 mL  Total OUT: 500 mL    Total NET: -500 mL          Impression: 58y Female admitted with SMALL BOWEL OBSTRUCTION  hypokalemia  improved BUN with hydration  h/o DVT left LE 5 years ago  hypermagnasemia    Plan:  -VTE prophylaxis   - continue IVAB  -Increase activity , OOB, Ambulate in attempt to stimulate bowel function  -replace potassium  -f/u AM labs in AM  -will discuss with Dr. Varma

## 2018-11-08 LAB
ANION GAP SERPL CALC-SCNC: 10 MMOL/L — SIGNIFICANT CHANGE UP (ref 5–17)
ANION GAP SERPL CALC-SCNC: 13 MMOL/L — SIGNIFICANT CHANGE UP (ref 5–17)
APTT BLD: 27.8 SEC — LOW (ref 28.5–37)
BLD GP AB SCN SERPL QL: SIGNIFICANT CHANGE UP
BUN SERPL-MCNC: 13 MG/DL — SIGNIFICANT CHANGE UP (ref 7–23)
BUN SERPL-MCNC: 15 MG/DL — SIGNIFICANT CHANGE UP (ref 7–23)
CALCIUM SERPL-MCNC: 7.8 MG/DL — LOW (ref 8.5–10.1)
CALCIUM SERPL-MCNC: 8.1 MG/DL — LOW (ref 8.5–10.1)
CHLORIDE SERPL-SCNC: 94 MMOL/L — LOW (ref 96–108)
CHLORIDE SERPL-SCNC: 99 MMOL/L — SIGNIFICANT CHANGE UP (ref 96–108)
CO2 SERPL-SCNC: 30 MMOL/L — SIGNIFICANT CHANGE UP (ref 22–31)
CO2 SERPL-SCNC: 32 MMOL/L — HIGH (ref 22–31)
CREAT SERPL-MCNC: 0.52 MG/DL — SIGNIFICANT CHANGE UP (ref 0.5–1.3)
CREAT SERPL-MCNC: 0.7 MG/DL — SIGNIFICANT CHANGE UP (ref 0.5–1.3)
GLUCOSE SERPL-MCNC: 110 MG/DL — HIGH (ref 70–99)
GLUCOSE SERPL-MCNC: 80 MG/DL — SIGNIFICANT CHANGE UP (ref 70–99)
HCT VFR BLD CALC: 35.9 % — SIGNIFICANT CHANGE UP (ref 34.5–45)
HCT VFR BLD CALC: 37.1 % — SIGNIFICANT CHANGE UP (ref 34.5–45)
HGB BLD-MCNC: 11.3 G/DL — LOW (ref 11.5–15.5)
HGB BLD-MCNC: 11.9 G/DL — SIGNIFICANT CHANGE UP (ref 11.5–15.5)
INR BLD: 1.2 RATIO — HIGH (ref 0.88–1.16)
LACTATE SERPL-SCNC: 1 MMOL/L — SIGNIFICANT CHANGE UP (ref 0.7–2)
MAGNESIUM SERPL-MCNC: 2.7 MG/DL — HIGH (ref 1.6–2.6)
MCHC RBC-ENTMCNC: 28.5 PG — SIGNIFICANT CHANGE UP (ref 27–34)
MCHC RBC-ENTMCNC: 29 PG — SIGNIFICANT CHANGE UP (ref 27–34)
MCHC RBC-ENTMCNC: 31.5 GM/DL — LOW (ref 32–36)
MCHC RBC-ENTMCNC: 32.1 GM/DL — SIGNIFICANT CHANGE UP (ref 32–36)
MCV RBC AUTO: 90.5 FL — SIGNIFICANT CHANGE UP (ref 80–100)
MCV RBC AUTO: 90.7 FL — SIGNIFICANT CHANGE UP (ref 80–100)
NRBC # BLD: 0 /100 WBCS — SIGNIFICANT CHANGE UP (ref 0–0)
NRBC # BLD: 0 /100 WBCS — SIGNIFICANT CHANGE UP (ref 0–0)
PHOSPHATE SERPL-MCNC: 2.5 MG/DL — SIGNIFICANT CHANGE UP (ref 2.5–4.5)
PLATELET # BLD AUTO: 273 K/UL — SIGNIFICANT CHANGE UP (ref 150–400)
PLATELET # BLD AUTO: 313 K/UL — SIGNIFICANT CHANGE UP (ref 150–400)
POTASSIUM SERPL-MCNC: 3.1 MMOL/L — LOW (ref 3.5–5.3)
POTASSIUM SERPL-MCNC: 3.9 MMOL/L — SIGNIFICANT CHANGE UP (ref 3.5–5.3)
POTASSIUM SERPL-SCNC: 3.1 MMOL/L — LOW (ref 3.5–5.3)
POTASSIUM SERPL-SCNC: 3.9 MMOL/L — SIGNIFICANT CHANGE UP (ref 3.5–5.3)
PROTHROM AB SERPL-ACNC: 13.5 SEC — HIGH (ref 10–12.9)
RBC # BLD: 3.96 M/UL — SIGNIFICANT CHANGE UP (ref 3.8–5.2)
RBC # BLD: 4.1 M/UL — SIGNIFICANT CHANGE UP (ref 3.8–5.2)
RBC # FLD: 13.8 % — SIGNIFICANT CHANGE UP (ref 10.3–14.5)
RBC # FLD: 13.8 % — SIGNIFICANT CHANGE UP (ref 10.3–14.5)
SODIUM SERPL-SCNC: 139 MMOL/L — SIGNIFICANT CHANGE UP (ref 135–145)
SODIUM SERPL-SCNC: 139 MMOL/L — SIGNIFICANT CHANGE UP (ref 135–145)
WBC # BLD: 8.73 K/UL — SIGNIFICANT CHANGE UP (ref 3.8–10.5)
WBC # BLD: 8.76 K/UL — SIGNIFICANT CHANGE UP (ref 3.8–10.5)
WBC # FLD AUTO: 8.73 K/UL — SIGNIFICANT CHANGE UP (ref 3.8–10.5)
WBC # FLD AUTO: 8.76 K/UL — SIGNIFICANT CHANGE UP (ref 3.8–10.5)

## 2018-11-08 PROCEDURE — 74019 RADEX ABDOMEN 2 VIEWS: CPT | Mod: 26

## 2018-11-08 PROCEDURE — 88305 TISSUE EXAM BY PATHOLOGIST: CPT | Mod: 26

## 2018-11-08 PROCEDURE — 44005 FREEING OF BOWEL ADHESION: CPT | Mod: AS

## 2018-11-08 PROCEDURE — 88307 TISSUE EXAM BY PATHOLOGIST: CPT | Mod: 26

## 2018-11-08 PROCEDURE — 93970 EXTREMITY STUDY: CPT | Mod: 26

## 2018-11-08 RX ORDER — POTASSIUM CHLORIDE 20 MEQ
20 PACKET (EA) ORAL
Qty: 0 | Refills: 0 | Status: COMPLETED | OUTPATIENT
Start: 2018-11-08 | End: 2018-11-08

## 2018-11-08 RX ORDER — ACETAMINOPHEN 500 MG
1000 TABLET ORAL ONCE
Qty: 0 | Refills: 0 | Status: COMPLETED | OUTPATIENT
Start: 2018-11-08 | End: 2018-11-08

## 2018-11-08 RX ORDER — ONDANSETRON 8 MG/1
4 TABLET, FILM COATED ORAL EVERY 6 HOURS
Qty: 0 | Refills: 0 | Status: DISCONTINUED | OUTPATIENT
Start: 2018-11-08 | End: 2018-11-17

## 2018-11-08 RX ORDER — METOPROLOL TARTRATE 50 MG
5 TABLET ORAL EVERY 6 HOURS
Qty: 0 | Refills: 0 | Status: DISCONTINUED | OUTPATIENT
Start: 2018-11-08 | End: 2018-11-13

## 2018-11-08 RX ORDER — ACETAMINOPHEN 500 MG
1000 TABLET ORAL ONCE
Qty: 0 | Refills: 0 | Status: COMPLETED | OUTPATIENT
Start: 2018-11-08 | End: 2018-11-09

## 2018-11-08 RX ORDER — PANTOPRAZOLE SODIUM 20 MG/1
40 TABLET, DELAYED RELEASE ORAL DAILY
Qty: 0 | Refills: 0 | Status: DISCONTINUED | OUTPATIENT
Start: 2018-11-08 | End: 2018-11-17

## 2018-11-08 RX ORDER — PIPERACILLIN AND TAZOBACTAM 4; .5 G/20ML; G/20ML
3.38 INJECTION, POWDER, LYOPHILIZED, FOR SOLUTION INTRAVENOUS EVERY 8 HOURS
Qty: 0 | Refills: 0 | Status: DISCONTINUED | OUTPATIENT
Start: 2018-11-08 | End: 2018-11-12

## 2018-11-08 RX ORDER — ONDANSETRON 8 MG/1
4 TABLET, FILM COATED ORAL EVERY 8 HOURS
Qty: 0 | Refills: 0 | Status: DISCONTINUED | OUTPATIENT
Start: 2018-11-08 | End: 2018-11-08

## 2018-11-08 RX ORDER — DEXTROSE MONOHYDRATE, SODIUM CHLORIDE, AND POTASSIUM CHLORIDE 50; .745; 4.5 G/1000ML; G/1000ML; G/1000ML
1000 INJECTION, SOLUTION INTRAVENOUS
Qty: 0 | Refills: 0 | Status: DISCONTINUED | OUTPATIENT
Start: 2018-11-08 | End: 2018-11-08

## 2018-11-08 RX ORDER — MORPHINE SULFATE 50 MG/1
2 CAPSULE, EXTENDED RELEASE ORAL EVERY 6 HOURS
Qty: 0 | Refills: 0 | Status: DISCONTINUED | OUTPATIENT
Start: 2018-11-08 | End: 2018-11-10

## 2018-11-08 RX ORDER — DEXTROSE MONOHYDRATE, SODIUM CHLORIDE, AND POTASSIUM CHLORIDE 50; .745; 4.5 G/1000ML; G/1000ML; G/1000ML
1000 INJECTION, SOLUTION INTRAVENOUS
Qty: 0 | Refills: 0 | Status: DISCONTINUED | OUTPATIENT
Start: 2018-11-08 | End: 2018-11-13

## 2018-11-08 RX ORDER — HYDROMORPHONE HYDROCHLORIDE 2 MG/ML
1 INJECTION INTRAMUSCULAR; INTRAVENOUS; SUBCUTANEOUS
Qty: 0 | Refills: 0 | Status: DISCONTINUED | OUTPATIENT
Start: 2018-11-08 | End: 2018-11-08

## 2018-11-08 RX ORDER — HEPARIN SODIUM 5000 [USP'U]/ML
5000 INJECTION INTRAVENOUS; SUBCUTANEOUS EVERY 8 HOURS
Qty: 0 | Refills: 0 | Status: DISCONTINUED | OUTPATIENT
Start: 2018-11-08 | End: 2018-11-17

## 2018-11-08 RX ORDER — SODIUM CHLORIDE 9 MG/ML
1000 INJECTION, SOLUTION INTRAVENOUS
Qty: 0 | Refills: 0 | Status: DISCONTINUED | OUTPATIENT
Start: 2018-11-08 | End: 2018-11-08

## 2018-11-08 RX ADMIN — MORPHINE SULFATE 2 MILLIGRAM(S): 50 CAPSULE, EXTENDED RELEASE ORAL at 23:57

## 2018-11-08 RX ADMIN — PIPERACILLIN AND TAZOBACTAM 25 GRAM(S): 4; .5 INJECTION, POWDER, LYOPHILIZED, FOR SOLUTION INTRAVENOUS at 14:04

## 2018-11-08 RX ADMIN — MORPHINE SULFATE 2 MILLIGRAM(S): 50 CAPSULE, EXTENDED RELEASE ORAL at 23:37

## 2018-11-08 RX ADMIN — SODIUM CHLORIDE 100 MILLILITER(S): 9 INJECTION, SOLUTION INTRAVENOUS at 18:40

## 2018-11-08 RX ADMIN — PIPERACILLIN AND TAZOBACTAM 25 GRAM(S): 4; .5 INJECTION, POWDER, LYOPHILIZED, FOR SOLUTION INTRAVENOUS at 22:04

## 2018-11-08 RX ADMIN — Medication 1000 MILLIGRAM(S): at 19:40

## 2018-11-08 RX ADMIN — Medication 400 MILLIGRAM(S): at 19:25

## 2018-11-08 RX ADMIN — PIPERACILLIN AND TAZOBACTAM 25 GRAM(S): 4; .5 INJECTION, POWDER, LYOPHILIZED, FOR SOLUTION INTRAVENOUS at 05:50

## 2018-11-08 RX ADMIN — PANTOPRAZOLE SODIUM 40 MILLIGRAM(S): 20 TABLET, DELAYED RELEASE ORAL at 11:08

## 2018-11-08 RX ADMIN — Medication 50 MILLIEQUIVALENT(S): at 14:04

## 2018-11-08 RX ADMIN — HEPARIN SODIUM 5000 UNIT(S): 5000 INJECTION INTRAVENOUS; SUBCUTANEOUS at 22:04

## 2018-11-08 RX ADMIN — DEXTROSE MONOHYDRATE, SODIUM CHLORIDE, AND POTASSIUM CHLORIDE 125 MILLILITER(S): 50; .745; 4.5 INJECTION, SOLUTION INTRAVENOUS at 22:12

## 2018-11-08 RX ADMIN — Medication 50 MILLIEQUIVALENT(S): at 15:54

## 2018-11-08 RX ADMIN — DEXTROSE MONOHYDRATE, SODIUM CHLORIDE, AND POTASSIUM CHLORIDE 125 MILLILITER(S): 50; .745; 4.5 INJECTION, SOLUTION INTRAVENOUS at 12:04

## 2018-11-08 RX ADMIN — HEPARIN SODIUM 5000 UNIT(S): 5000 INJECTION INTRAVENOUS; SUBCUTANEOUS at 05:50

## 2018-11-08 RX ADMIN — AMLODIPINE BESYLATE 10 MILLIGRAM(S): 2.5 TABLET ORAL at 05:50

## 2018-11-08 RX ADMIN — Medication 50 MILLIEQUIVALENT(S): at 12:28

## 2018-11-08 NOTE — PROGRESS NOTE ADULT - SUBJECTIVE AND OBJECTIVE BOX
Patient seen and examined at bedside in mild discomfort.  Patient states abdominal pain is slightly improved from admission, but still present.  Patient states abdominal distention is slightly improved from admission, but still distended.  Patient admits to nausea. Patient denies vomiting.   No flatus/BM.  Denies leg pain.    T(F): 99 (18 @ 06:41), Max: 99.5 (18 @ 17:35)  HR: 85 (18 @ 06:41) (83 - 91)  BP: 138/78 (18 @ 06:41) (136/80 - 153/90)  RR: 18 (18 @ 06:41) (16 - 18)  SpO2: 97% (18 @ 06:41) (97% - 97%)      PHYSICAL EXAM:  General: alert and awake, NAD  Chest: nonlabored respirations  Abdomen: No BS. soft, nontender, mild distension.   Extremities: calf soft, no calf swelling noted     LABS:                        11.3   8.76  )-----------( 273      ( 2018 07:23 )             35.9         139  |  94<L>  |  15  ----------------------------<  80  3.1<L>   |  32<H>  |  0.70    Ca    8.1<L>      2018 07:23  Phos  2.5       Mg     2.7         TPro  7.0  /  Alb  2.9<L>  /  TBili  0.8  /  DBili  x   /  AST  13<L>  /  ALT  18  /  AlkPhos  65  11-    PT/INR - ( 2018 07:23 )   PT: 13.5 sec;   INR: 1.20 ratio         PTT - ( 2018 07:23 )  PTT:27.8 sec  I&O's Detail    2018 07:01  -  2018 07:00  --------------------------------------------------------  IN:    lactated ringers.: 400 mL    sodium chloride 0.9% with potassium chloride 20 mEq/L: 1360 mL    Solution: 300 mL    Solution: 125 mL  Total IN: 2185 mL    OUT:    Nasoenteral Tube: 800 mL    Voided: 500 mL  Total OUT: 1300 mL    Total NET: 885 mL      Radiology:    < from: US Duplex Venous Lower Ext Complete, Bilateral (18 @ 08:58) >  EXAM:  US DPLX LWR EXT VEINS COMPL BI                            PROCEDURE DATE:  2018          INTERPRETATION:  pain and swelling both lower extremities     FINDINGS:       duplex sonography inclusive of color and spectral doppler was performed   with and without compression.   both lower extremities were studied.    Spontaneous flow is identified in the common femoral, superficial femoral   and popliteal veins. The veins are patent and compressible without   evidence of thrombus. The posterior tibial and peroneal veins are also   patent.    IMPRESSION:     No evidence of deep vein thrombosis in the visualized venous segments of   either  lower extremity.       AXR performed this AM, image suggestive of persistent SBO, appears unchanged from yesterday. Awaiting official report.    Impression: 58F with a PMH of HLD, HTN,  27 years ago, DVT on Coumadin x5 years, admitted with SBO, now with persistent SBO and hypokalemia.    Plan:  -persistent SBO, will require ex lap, possible TIGIST   -continue NPO, NGT  -rate of IVF increased  -potassium repleted  -continue DVT prophylaxis with Heparin  -continue medical management/supportive care  -f/u AM labs  -discussed with Dr. Varma.    ANUSHA UnderwoodS    I have examined this patient. I agree with the above.  J.M.Behan, PA-C Patient seen and examined at bedside in mild discomfort.  Patient states abdominal pain is slightly improved from admission, but still present.  Patient states abdominal distention is slightly improved from admission, but still distended.  Patient admits to nausea. Patient denies vomiting.   No flatus/BM.  Denies leg pain.    T(F): 99 (18 @ 06:41), Max: 99.5 (18 @ 17:35)  HR: 85 (18 @ 06:41) (83 - 91)  BP: 138/78 (18 @ 06:41) (136/80 - 153/90)  RR: 18 (18 @ 06:41) (16 - 18)  SpO2: 97% (18 @ 06:41) (97% - 97%)      PHYSICAL EXAM:  General: alert and awake, NAD  Chest: nonlabored respirations  Abdomen: No BS. soft, nontender, mild distension.   Extremities: calf soft, no calf swelling noted     LABS:                        11.3   8.76  )-----------( 273      ( 2018 07:23 )             35.9         139  |  94<L>  |  15  ----------------------------<  80  3.1<L>   |  32<H>  |  0.70    Ca    8.1<L>      2018 07:23  Phos  2.5       Mg     2.7         TPro  7.0  /  Alb  2.9<L>  /  TBili  0.8  /  DBili  x   /  AST  13<L>  /  ALT  18  /  AlkPhos  65  11-    PT/INR - ( 2018 07:23 )   PT: 13.5 sec;   INR: 1.20 ratio         PTT - ( 2018 07:23 )  PTT:27.8 sec  I&O's Detail    2018 07:01  -  2018 07:00  --------------------------------------------------------  IN:    lactated ringers.: 400 mL    sodium chloride 0.9% with potassium chloride 20 mEq/L: 1360 mL    Solution: 300 mL    Solution: 125 mL  Total IN: 2185 mL    OUT:    Nasoenteral Tube: 800 mL    Voided: 500 mL  Total OUT: 1300 mL    Total NET: 885 mL      Radiology:    < from: US Duplex Venous Lower Ext Complete, Bilateral (18 @ 08:58) >  EXAM:  US DPLX LWR EXT VEINS COMPL BI                            PROCEDURE DATE:  2018          INTERPRETATION:  pain and swelling both lower extremities     FINDINGS:       duplex sonography inclusive of color and spectral doppler was performed   with and without compression.   both lower extremities were studied.    Spontaneous flow is identified in the common femoral, superficial femoral   and popliteal veins. The veins are patent and compressible without   evidence of thrombus. The posterior tibial and peroneal veins are also   patent.    IMPRESSION:     No evidence of deep vein thrombosis in the visualized venous segments of   either  lower extremity.       AXR performed this AM, image suggestive of persistent SBO, appears unchanged from yesterday. Awaiting official report.    Impression: 58F with a PMH of HLD, HTN,  27 years ago, DVT on Coumadin x5 years, admitted with SBO, now with persistent SBO and hypokalemia.    Plan:  -persistent SBO, will require ex lap, possible TIGIST   -continue NPO, NGT  -rate of IVF increased  -potassium repleted  -continue DVT prophylaxis with Heparin  -continue medical management/supportive care  -f/u AM labs  - discussed with pt. Questions answered.  -discussed with Dr. Varma.      ANUSHA UnderwoodS    I have examined this patient. I agree with the above.  J.M.Behan, PA-C

## 2018-11-08 NOTE — PROGRESS NOTE ADULT - SUBJECTIVE AND OBJECTIVE BOX
POST OP CHECK NOTE    HPI: 58y old  Female who presented with a chief complaint of Pt has abdominal pain associated with N/V and was admitted with SMALL BOWEL OBSTRUCTION/ ABDOMINAL PAIN    Interval History: POD 0 s/p Ex lap, TIGIST, SBR with primary anastomosis Patient seen and examined at bedside. remains afebrile with normal vitals post op. NGT in place on LIS with minimal dark green bilious output. Patient admits to pain around the incisional site with some mild nausea but no vomiting. Denies passing any flatus. Has not been OOB yet. IS at bedside, taught patient to use it.    Vital Signs Last 24 Hrs  T(F): 99.1 (11-08-18 @ 21:40), Max: 99.6 (11-08-18 @ 15:48)  HR: 78 (11-08-18 @ 21:40)  BP: 151/78 (11-08-18 @ 21:40)  RR: 20 (11-08-18 @ 21:40)  SpO2: 100% (11-08-18 @ 21:40)    PHYSICAL EXAM:  GENERAL: Alert, NAD. NGT secure   CHEST/LUNG: Clear to auscultation bilaterally  HEART: Regular rate and rhythm; S1 & S2 appreciated  ABDOMEN: Midline aquacel dressing with minimal sanguinous saturation. Abdomen is softly distended, +TTP around incision site. No rebound, no guarding. Blanco in place with clear yellow urine  EXTREMITIES:  no calf tenderness, No edema. SCDs on    I&O's Detail  07 Nov 2018 07:01  -  08 Nov 2018 07:00  --------------------------------------------------------  IN:    lactated ringers.: 400 mL    sodium chloride 0.9% with potassium chloride 20 mEq/L: 1360 mL    Solution: 300 mL    Solution: 125 mL  Total IN: 2185 mL  OUT:    Nasoenteral Tube: 800 mL    Voided: 500 mL  Total OUT: 1300 mL  Total NET: 885 mL    08 Nov 2018 07:01  -  08 Nov 2018 22:21  --------------------------------------------------------  IN:    Solution: 100 mL    Solution: 200 mL    Solution: 100 mL  Total IN: 400 mL    OUT:    Indwelling Catheter - Urethral: 300 mL    Nasoenteral Tube: 350 mL  Total OUT: 650 mL    Total NET: -250 mL    LABS:                        11.9   8.73  )-----------( 313      ( 08 Nov 2018 19:10 )             37.1   139  |  99  |  13  ----------------------------<  110<H>  3.9   |  30  |  0.52  Ca    7.8<L>      08 Nov 2018 19:10  Phos  2.5     11-08  Mg     2.7     11-08  TPro  7.0  /  Alb  2.9<L>  /  TBili  0.8  /  DBili  x   /  AST  13<L>  /  ALT  18  /  AlkPhos  65  11-07  PT/INR - ( 08 Nov 2018 07:23 )   PT: 13.5 sec;   INR: 1.20 ratio    PTT - ( 08 Nov 2018 07:23 )  PTT:27.8 sec      ASSESSMENT & PLAN:  58F with a PMH of HTN, HLD, DVT who presented with abdominal pain, found to have a SBO and is now POD 0 s/p Ex Lap, TIGIST, SBR with primary anastomosis.    - NGT to LIS, monitor outputs  - Continue Zosyn   - NPO until ROBF  - Post op labs appreciated, f/u  AM labs  - [place] OOB/Ambulation  - Monitor UO with blanco, may consider d/c tomorrow  - Restart home Amlodipine for HTN, Hold Coumadin  - DVT prophylaxis, Incentive Spirometer, OOB, Ambulating, pain/nausea management as needed  - will discuss with surgical attending POST OP CHECK NOTE    HPI: 58y old  Female who presented with a chief complaint of abdominal pain associated with N/V and was admitted with SMALL BOWEL OBSTRUCTION/ ABDOMINAL PAIN    Interval History: POD 0 s/p Ex lap, TIGIST, SBR with primary anastomosis. Patient seen and examined at bedside. Remains afebrile with normal vitals post op. NGT in place on LIS with minimal dark green bilious output. Patient admits to pain around the incision site with some mild nausea but no vomiting. Denies passing any flatus. Blanco to gravity with clear yellow urine. Has not been OOB yet. IS at bedside,    Vital Signs Last 24 Hrs  T(F): 99.1 (11-08-18 @ 21:40), Max: 99.6 (11-08-18 @ 15:48)  HR: 78 (11-08-18 @ 21:40)  BP: 151/78 (11-08-18 @ 21:40)  RR: 20 (11-08-18 @ 21:40)  SpO2: 100% (11-08-18 @ 21:40)    PHYSICAL EXAM:  GENERAL: Alert, NAD. NGT secure   CHEST/LUNG: Clear to auscultation bilaterally  HEART: Regular rate and rhythm; S1 & S2 appreciated  ABDOMEN: Midline aquacel dressing with minimal sanguinous saturation. Abdomen is softly distended, +TTP around incision site. No rebound, no guarding. Blanco in place with clear yellow urine  EXTREMITIES:  no calf tenderness, No edema. SCDs on    I&O's Detail  07 Nov 2018 07:01  -  08 Nov 2018 07:00  --------------------------------------------------------  IN:    lactated ringers.: 400 mL    sodium chloride 0.9% with potassium chloride 20 mEq/L: 1360 mL    Solution: 300 mL    Solution: 125 mL  Total IN: 2185 mL  OUT:    Nasoenteral Tube: 800 mL    Voided: 500 mL  Total OUT: 1300 mL  Total NET: 885 mL    08 Nov 2018 07:01  -  08 Nov 2018 22:21  --------------------------------------------------------  IN:    Solution: 100 mL    Solution: 200 mL    Solution: 100 mL  Total IN: 400 mL    OUT:    Indwelling Catheter - Urethral: 300 mL    Nasoenteral Tube: 350 mL  Total OUT: 650 mL    Total NET: -250 mL    LABS:                        11.9   8.73  )-----------( 313      ( 08 Nov 2018 19:10 )             37.1   139  |  99  |  13  ----------------------------<  110<H>  3.9   |  30  |  0.52  Ca    7.8<L>      08 Nov 2018 19:10  Phos  2.5     11-08  Mg     2.7     11-08  TPro  7.0  /  Alb  2.9<L>  /  TBili  0.8  /  DBili  x   /  AST  13<L>  /  ALT  18  /  AlkPhos  65  11-07  PT/INR - ( 08 Nov 2018 07:23 )   PT: 13.5 sec;   INR: 1.20 ratio    PTT - ( 08 Nov 2018 07:23 )  PTT:27.8 sec      ASSESSMENT & PLAN:  58F with a PMH of HTN, HLD, DVT who presented with abdominal pain, found to have a SBO and is now POD 0 s/p Ex Lap, TIGIST, SBR with primary anastomosis.    - NGT to LIS, monitor outputs  - NPO until ROBF, D5 1/2NS + KCL  - Continue Zosyn  - Post op labs appreciated, f/u  AM labs  - Monitor UO with blanco, may consider d/c tomorrow  - Metoprolol IV and Restart home Amlodipine once PO. Hold Coumadin  - DVT/GI prophylaxis, Incentive Spirometer, OOB, Ambulating, pain/nausea management as needed  - Appreciate Medicine recs

## 2018-11-08 NOTE — PROGRESS NOTE ADULT - SUBJECTIVE AND OBJECTIVE BOX
This 58yFemale is scheduled for: ex lap, TIGIST      CBC Full  -  ( 2018 07:23 )  WBC Count : 8.76 K/uL  Hemoglobin : 11.3 g/dL  Hematocrit : 35.9 %  Platelet Count - Automated : 273 K/uL  Mean Cell Volume : 90.7 fl  Mean Cell Hemoglobin : 28.5 pg  Mean Cell Hemoglobin Concentration : 31.5 gm/dL          139  |  94<L>  |  15  ----------------------------<  80  3.1<L>   |  32<H>  |  0.70    Ca    8.1<L>      2018 07:23  Phos  2.5       Mg     2.7         TPro  7.0  /  Alb  2.9<L>  /  TBili  0.8  /  DBili  x   /  AST  13<L>  /  ALT  18  /  AlkPhos  65  11-07  LIVER FUNCTIONS - ( 2018 07:39 )  Alb: 2.9 g/dL / Pro: 7.0 gm/dL / ALK PHOS: 65 U/L / ALT: 18 U/L / AST: 13 U/L / GGT: x              PT/INR - ( 2018 07:23 )   PT: 13.5 sec;   INR: 1.20 ratio         PTT - ( 2018 07:23 )  PTT:27.8 sec    Urinalysis Basic - ( 2018 20:20 )    Color: Yellow / Appearance: Slightly Turbid / S.015 / pH: x  Gluc: x / Ketone: Large  / Bili: Negative / Urobili: Negative mg/dL   Blood: x / Protein: 30 mg/dL / Nitrite: Negative   Leuk Esterase: Negative / RBC: 11-25 /HPF / WBC 3-5   Sq Epi: x / Non Sq Epi: Many / Bacteria: Moderate    HCG Quantitative, Serum (18 @ 11:26)    HCG Quantitative, Serum: 2      - consent: to be obtained by Dr. Varma   - orders written  - medical consult: by Dr. Godfrey  - H&P on the chart  - T&S: done      KARINA Underwood    I have examined this patient. I agree with the above.  J.M.Behan, PA-C

## 2018-11-08 NOTE — BRIEF OPERATIVE NOTE - PROCEDURE
<<-----Click on this checkbox to enter Procedure Small bowel resection  11/08/2018    Active  JBALLABON  Lysis of adhesions  11/08/2018    Active  JBALLABON  Exploratory laparotomy  11/08/2018    Active  JBALLABON

## 2018-11-08 NOTE — PROGRESS NOTE ADULT - SUBJECTIVE AND OBJECTIVE BOX
Patient is a 58y old  Female who presents with a chief complaint of Pt has abdominal pain associated with N/V (2018 10:26)  SBO.    INTERVAL HPI/OVERNIGHT EVENTS:uneventful  PAST MEDICAL & SURGICAL HISTORY:  HLD (hyperlipidemia)  HTN (hypertension)  DVT (deep venous thrombosis)  History of       MEDICATIONS  (STANDING):  amLODIPine   Tablet 10 milliGRAM(s) Oral daily  dextrose 5% + sodium chloride 0.9% with potassium chloride 20 mEq/L 1000 milliLiter(s) (125 mL/Hr) IV Continuous <Continuous>  heparin  Injectable 5000 Unit(s) SubCutaneous every 8 hours  pantoprazole  Injectable 40 milliGRAM(s) IV Push daily  piperacillin/tazobactam IVPB. 3.375 Gram(s) IV Intermittent every 8 hours  potassium chloride  20 mEq/100 mL IVPB 20 milliEquivalent(s) IV Intermittent every 2 hours    MEDICATIONS  (PRN):  acetaminophen   Tablet .. 650 milliGRAM(s) Oral every 6 hours PRN Temp greater or equal to 38C (100.4F), Mild Pain (1 - 3)  acetaminophen  IVPB .. 1000 milliGRAM(s) IV Intermittent once PRN Mild Pain (1 - 3)  metoprolol tartrate Injectable 5 milliGRAM(s) IV Push every 6 hours PRN for BP >180/100  morphine  - Injectable 2 milliGRAM(s) IV Push every 4 hours PRN Moderate Pain (4 - 6)  morphine  - Injectable 4 milliGRAM(s) IV Push four times a day PRN Severe Pain (7 - 10)  ondansetron Injectable 4 milliGRAM(s) IV Push every 8 hours PRN Nausea and/or Vomiting      Allergies    No Known Allergies    Intolerances        REVIEW OF SYSTEMS:  CONSTITUTIONAL: No fever, weight loss, or fatigue  EYES: No eye pain, visual disturbances, or discharge  ENMT:  No difficulty hearing, tinnitus, vertigo; No sinus or throat pain  NECK: No pain or stiffness  BREASTS: No pain, masses, or nipple discharge  RESPIRATORY: No cough, wheezing, chills or hemoptysis; No shortness of breath  CARDIOVASCULAR: No chest pain, palpitations, dizziness, or leg swelling  GASTROINTESTINAL: No abdominal or epigastric pain. No nausea, vomiting, or hematemesis; No diarrhea or constipation. No melena or hematochezia.  GENITOURINARY: No dysuria, frequency, hematuria, or incontinence  NEUROLOGICAL: No headaches, memory loss, loss of strength, numbness, or tremors  SKIN: No itching, burning, rashes, or lesions   LYMPH NODES: No enlarged glands  ENDOCRINE: No heat or cold intolerance; No hair loss  MUSCULOSKELETAL: No joint pain or swelling; No muscle, back, or extremity pain  PSYCHIATRIC: No depression, anxiety, mood swings, or difficulty sleeping  HEME/LYMPH: No easy bruising, or bleeding gums  ALLERY AND IMMUNOLOGIC: No hives or eczema    Vital Signs Last 24 Hrs  T(C): 37.2 (2018 06:41), Max: 37.5 (2018 17:35)  T(F): 99 (2018 06:41), Max: 99.5 (2018 17:35)  HR: 85 (2018 06:41) (83 - 91)  BP: 138/78 (2018 06:41) (136/80 - 153/90)  BP(mean): --  RR: 18 (2018 06:41) (16 - 18)  SpO2: 97% (2018 06:41) (97% - 97%)    PHYSICAL EXAM:  GENERAL: NAD, well-groomed, well-developed  HEAD:  Atraumatic, Normocephalic  EYES: EOMI, PERRLA, conjunctiva and sclera clear  ENMT: No tonsillar erythema, exudates, or enlargement; Moist mucous membranes, Good dentition, No lesions  NECK: Supple, No JVD, Normal thyroid  NERVOUS SYSTEM:  Alert & Oriented X3, Good concentration; Motor Strength 5/5 B/L upper and lower extremities; DTRs 2+ intact and symmetric  CHEST/LUNG: Clear to percussion bilaterally; No rales, rhonchi, wheezing, or rubs  HEART: Regular rate and rhythm; No murmurs, rubs, or gallops  ABDOMEN: Soft, Nontender, Nondistended; Bowel sounds present. N/g tube in place.  EXTREMITIES:  2+ Peripheral Pulses, No clubbing, cyanosis, or edema  LYMPH: No lymphadenopathy noted  SKIN: No rashes or lesions    LABS:                        11.3   8.76  )-----------( 273      ( 2018 07:23 )             35.9     11-08    139  |  94<L>  |  15  ----------------------------<  80  3.1<L>   |  32<H>  |  0.70    Ca    8.1<L>      2018 07:23  Phos  2.5       Mg     2.7         TPro  7.0  /  Alb  2.9<L>  /  TBili  0.8  /  DBili  x   /  AST  13<L>  /  ALT  18  /  AlkPhos  65        PT/INR - ( 2018 07:23 )   PT: 13.5 sec;   INR: 1.20 ratio         PTT - ( 2018 07:23 )  PTT:27.8 sec  Urinalysis Basic - ( 2018 20:20 )    Color: Yellow / Appearance: Slightly Turbid / S.015 / pH: x  Gluc: x / Ketone: Large  / Bili: Negative / Urobili: Negative mg/dL   Blood: x / Protein: 30 mg/dL / Nitrite: Negative   Leuk Esterase: Negative / RBC: 11-25 /HPF / WBC 3-5   Sq Epi: x / Non Sq Epi: Many / Bacteria: Moderate      CAPILLARY BLOOD GLUCOSE                    RADIOLOGY & ADDITIONAL TESTS:    Imaging Personally Reviewed:  [ ] YES  [ ] NO    Consultant(s) Notes Reviewed:  [ ] YES  [ ] NO    Care Discussed with Consultants/Other Providers [ ] YES  [ ] NO    Care discussed with family,         [  ]   yes  [  ]  No    imp:    stable[ ]    unstable[  ]     improving [   ]       unchanged  [ x ]                Plans:  Continue present plans  [ x ] potassium supplementation               New consult [  ]   specialty  .......               order test[  ]    test name.                  Discharge Planning  [  ]

## 2018-11-08 NOTE — PHARMACY COMMUNICATION NOTE - COMMENTS
spoke to Ally WEISS and she will stop large volume while running k riders over 60 min each
s/w jermaine coleman - aware pt's K level is now a 3.9 and wants to continue pt on d5w+ns=20meqkcl

## 2018-11-09 LAB
ANION GAP SERPL CALC-SCNC: 9 MMOL/L — SIGNIFICANT CHANGE UP (ref 5–17)
ANISOCYTOSIS BLD QL: SLIGHT — SIGNIFICANT CHANGE UP
BASOPHILS # BLD AUTO: 0 K/UL — SIGNIFICANT CHANGE UP (ref 0–0.2)
BASOPHILS NFR BLD AUTO: 0 % — SIGNIFICANT CHANGE UP (ref 0–2)
BUN SERPL-MCNC: 10 MG/DL — SIGNIFICANT CHANGE UP (ref 7–23)
CALCIUM SERPL-MCNC: 7.9 MG/DL — LOW (ref 8.5–10.1)
CHLORIDE SERPL-SCNC: 98 MMOL/L — SIGNIFICANT CHANGE UP (ref 96–108)
CO2 SERPL-SCNC: 31 MMOL/L — SIGNIFICANT CHANGE UP (ref 22–31)
CREAT SERPL-MCNC: 0.74 MG/DL — SIGNIFICANT CHANGE UP (ref 0.5–1.3)
EOSINOPHIL # BLD AUTO: 0 K/UL — SIGNIFICANT CHANGE UP (ref 0–0.5)
EOSINOPHIL NFR BLD AUTO: 0 % — SIGNIFICANT CHANGE UP (ref 0–6)
GIANT PLATELETS BLD QL SMEAR: PRESENT — SIGNIFICANT CHANGE UP
GLUCOSE SERPL-MCNC: 206 MG/DL — HIGH (ref 70–99)
HCT VFR BLD CALC: 38.7 % — SIGNIFICANT CHANGE UP (ref 34.5–45)
HGB BLD-MCNC: 12.5 G/DL — SIGNIFICANT CHANGE UP (ref 11.5–15.5)
HYPOCHROMIA BLD QL: SLIGHT — SIGNIFICANT CHANGE UP
LYMPHOCYTES # BLD AUTO: 1.42 K/UL — SIGNIFICANT CHANGE UP (ref 1–3.3)
LYMPHOCYTES # BLD AUTO: 13 % — SIGNIFICANT CHANGE UP (ref 13–44)
MAGNESIUM SERPL-MCNC: 2.5 MG/DL — SIGNIFICANT CHANGE UP (ref 1.6–2.6)
MANUAL SMEAR VERIFICATION: SIGNIFICANT CHANGE UP
MCHC RBC-ENTMCNC: 28.8 PG — SIGNIFICANT CHANGE UP (ref 27–34)
MCHC RBC-ENTMCNC: 32.3 GM/DL — SIGNIFICANT CHANGE UP (ref 32–36)
MCV RBC AUTO: 89.2 FL — SIGNIFICANT CHANGE UP (ref 80–100)
MICROCYTES BLD QL: SLIGHT — SIGNIFICANT CHANGE UP
MONOCYTES # BLD AUTO: 0.55 K/UL — SIGNIFICANT CHANGE UP (ref 0–0.9)
MONOCYTES NFR BLD AUTO: 5 % — SIGNIFICANT CHANGE UP (ref 2–14)
NEUTROPHILS # BLD AUTO: 8.94 K/UL — HIGH (ref 1.8–7.4)
NEUTROPHILS NFR BLD AUTO: 67 % — SIGNIFICANT CHANGE UP (ref 43–77)
NEUTS BAND # BLD: 15 % — HIGH (ref 0–8)
NRBC # BLD: 0 /100 — SIGNIFICANT CHANGE UP (ref 0–0)
NRBC # BLD: SIGNIFICANT CHANGE UP /100 WBCS (ref 0–0)
PHOSPHATE SERPL-MCNC: 2.3 MG/DL — LOW (ref 2.5–4.5)
PLAT MORPH BLD: NORMAL — SIGNIFICANT CHANGE UP
PLATELET # BLD AUTO: 316 K/UL — SIGNIFICANT CHANGE UP (ref 150–400)
POTASSIUM SERPL-MCNC: 4.1 MMOL/L — SIGNIFICANT CHANGE UP (ref 3.5–5.3)
POTASSIUM SERPL-SCNC: 4.1 MMOL/L — SIGNIFICANT CHANGE UP (ref 3.5–5.3)
RBC # BLD: 4.34 M/UL — SIGNIFICANT CHANGE UP (ref 3.8–5.2)
RBC # FLD: 13.9 % — SIGNIFICANT CHANGE UP (ref 10.3–14.5)
RBC BLD AUTO: ABNORMAL
SODIUM SERPL-SCNC: 138 MMOL/L — SIGNIFICANT CHANGE UP (ref 135–145)
WBC # BLD: 10.9 K/UL — HIGH (ref 3.8–10.5)
WBC # FLD AUTO: 10.9 K/UL — HIGH (ref 3.8–10.5)

## 2018-11-09 RX ORDER — ACETAMINOPHEN 500 MG
1000 TABLET ORAL ONCE
Qty: 0 | Refills: 0 | Status: COMPLETED | OUTPATIENT
Start: 2018-11-09 | End: 2018-11-09

## 2018-11-09 RX ADMIN — DEXTROSE MONOHYDRATE, SODIUM CHLORIDE, AND POTASSIUM CHLORIDE 125 MILLILITER(S): 50; .745; 4.5 INJECTION, SOLUTION INTRAVENOUS at 17:41

## 2018-11-09 RX ADMIN — Medication 400 MILLIGRAM(S): at 17:41

## 2018-11-09 RX ADMIN — PIPERACILLIN AND TAZOBACTAM 25 GRAM(S): 4; .5 INJECTION, POWDER, LYOPHILIZED, FOR SOLUTION INTRAVENOUS at 05:17

## 2018-11-09 RX ADMIN — Medication 400 MILLIGRAM(S): at 04:20

## 2018-11-09 RX ADMIN — PIPERACILLIN AND TAZOBACTAM 25 GRAM(S): 4; .5 INJECTION, POWDER, LYOPHILIZED, FOR SOLUTION INTRAVENOUS at 13:43

## 2018-11-09 RX ADMIN — PIPERACILLIN AND TAZOBACTAM 25 GRAM(S): 4; .5 INJECTION, POWDER, LYOPHILIZED, FOR SOLUTION INTRAVENOUS at 21:54

## 2018-11-09 RX ADMIN — HEPARIN SODIUM 5000 UNIT(S): 5000 INJECTION INTRAVENOUS; SUBCUTANEOUS at 05:17

## 2018-11-09 RX ADMIN — PANTOPRAZOLE SODIUM 40 MILLIGRAM(S): 20 TABLET, DELAYED RELEASE ORAL at 11:35

## 2018-11-09 RX ADMIN — HEPARIN SODIUM 5000 UNIT(S): 5000 INJECTION INTRAVENOUS; SUBCUTANEOUS at 13:44

## 2018-11-09 RX ADMIN — Medication 1000 MILLIGRAM(S): at 17:56

## 2018-11-09 RX ADMIN — Medication 1000 MILLIGRAM(S): at 04:58

## 2018-11-09 NOTE — PROGRESS NOTE ADULT - SUBJECTIVE AND OBJECTIVE BOX
HPI: 58y old  Female who presented with a chief complaint of Pt has abdominal pain associated with N/V and was admitted with SMALL BOWEL OBSTRUCTION/ABDOMINAL PAIN    Interval History: POD 1 s/p Ex lap, TIGIST, SBR with primary anastomosis. Patient seen and examined at bedside. Remains afebrile with normal vitals post op. NGT in place on LIS with minimal dark output. Patient admits to pain around the incision site with some mild nausea but no vomiting. Denies passing any flatus. Bowman to gravity with clear yellow urine. Has not been OOB yet. IS at bedside    Vital Signs Last 24 Hrs  T(F): 100.2 (11-09-18 @ 03:45), Max: 100.2 (11-09-18 @ 03:45)  HR: 92 (11-09-18 @ 03:45)  BP: 138/78 (11-09-18 @ 03:45)  RR: 18 (11-09-18 @ 03:45)  SpO2: 100% (11-09-18 @ 03:45)    PHYSICAL EXAM:  GENERAL: Alert, NAD. NGT secure   CHEST/LUNG: Clear to auscultation bilaterally  HEART: Regular rate and rhythm; S1 & S2 appreciated  ABDOMEN: Midline aquacel dressing with minimal sanguinous saturation. Abdomen is softly distended, +TTP around incision site. No rebound, no guarding. Bowman in place with clear yellow urine  EXTREMITIES:  no calf tenderness, No edema. SCDs on    I&O's Detail  08 Nov 2018 07:01  -  09 Nov 2018 07:00  --------------------------------------------------------  IN:    dextrose 5% + sodium chloride 0.9% with potassium chloride 20 mEq/L: 1500 mL    Solution: 100 mL    Solution: 300 mL    Solution: 200 mL  Total IN: 2100 mL  OUT:    Indwelling Catheter - Urethral: 800 mL    Nasoenteral Tube: 350 mL  Total OUT: 1150 mL  Total NET: 950 mL    LABS:                        12.5   10.90 )-----------( 316      ( 09 Nov 2018 08:08 )             38.7   138  |  98  |  10  ----------------------------<  206<H>  4.1   |  31  |  0.74  Ca    7.9<L>      09 Nov 2018 08:08  Phos  2.3     11-09  Mg     2.5     11-09  PT/INR - ( 08 Nov 2018 07:23 )   PT: 13.5 sec;   INR: 1.20 ratio    PTT - ( 08 Nov 2018 07:23 )  PTT:27.8 sec    ASSESSMENT & PLAN:  58F with a PMH of HTN, HLD, DVT who presented with abdominal pain, found to have a SBO and is now POD 1 s/p Ex Lap, TIGIST, SBR with primary anastomosis.    - NGT to LIS, monitor outputs  - NPO until ROBF, D5 1/2NS + KCL  - Continue Zosyn  - f/u AM labs  - d/c bella today  - Metoprolol IV and Restart home Amlodipine once PO. Hold home Coumadin  - DVT/GI prophylaxis, Incentive Spirometer, OOB, Ambulating, pain/nausea management as needed  - Appreciate Medicine recs  - Case will be discussed with Dr. Varma

## 2018-11-10 LAB
-  AMPICILLIN: SIGNIFICANT CHANGE UP
-  TETRACYCLINE: SIGNIFICANT CHANGE UP
-  VANCOMYCIN: SIGNIFICANT CHANGE UP
ANION GAP SERPL CALC-SCNC: 6 MMOL/L — SIGNIFICANT CHANGE UP (ref 5–17)
BUN SERPL-MCNC: 8 MG/DL — SIGNIFICANT CHANGE UP (ref 7–23)
CALCIUM SERPL-MCNC: 8.1 MG/DL — LOW (ref 8.5–10.1)
CHLORIDE SERPL-SCNC: 103 MMOL/L — SIGNIFICANT CHANGE UP (ref 96–108)
CO2 SERPL-SCNC: 30 MMOL/L — SIGNIFICANT CHANGE UP (ref 22–31)
CREAT SERPL-MCNC: 0.5 MG/DL — SIGNIFICANT CHANGE UP (ref 0.5–1.3)
CULTURE RESULTS: SIGNIFICANT CHANGE UP
GLUCOSE SERPL-MCNC: 128 MG/DL — HIGH (ref 70–99)
HCT VFR BLD CALC: 33.8 % — LOW (ref 34.5–45)
HGB BLD-MCNC: 10.9 G/DL — LOW (ref 11.5–15.5)
MAGNESIUM SERPL-MCNC: 2.6 MG/DL — SIGNIFICANT CHANGE UP (ref 1.6–2.6)
MCHC RBC-ENTMCNC: 29.1 PG — SIGNIFICANT CHANGE UP (ref 27–34)
MCHC RBC-ENTMCNC: 32.2 GM/DL — SIGNIFICANT CHANGE UP (ref 32–36)
MCV RBC AUTO: 90.1 FL — SIGNIFICANT CHANGE UP (ref 80–100)
METHOD TYPE: SIGNIFICANT CHANGE UP
NRBC # BLD: 0 /100 WBCS — SIGNIFICANT CHANGE UP (ref 0–0)
PHOSPHATE SERPL-MCNC: 1.6 MG/DL — LOW (ref 2.5–4.5)
PLATELET # BLD AUTO: 321 K/UL — SIGNIFICANT CHANGE UP (ref 150–400)
POTASSIUM SERPL-MCNC: 4 MMOL/L — SIGNIFICANT CHANGE UP (ref 3.5–5.3)
POTASSIUM SERPL-SCNC: 4 MMOL/L — SIGNIFICANT CHANGE UP (ref 3.5–5.3)
RBC # BLD: 3.75 M/UL — LOW (ref 3.8–5.2)
RBC # FLD: 14.3 % — SIGNIFICANT CHANGE UP (ref 10.3–14.5)
SODIUM SERPL-SCNC: 139 MMOL/L — SIGNIFICANT CHANGE UP (ref 135–145)
SPECIMEN SOURCE: SIGNIFICANT CHANGE UP
WBC # BLD: 13.64 K/UL — HIGH (ref 3.8–10.5)
WBC # FLD AUTO: 13.64 K/UL — HIGH (ref 3.8–10.5)

## 2018-11-10 RX ADMIN — HEPARIN SODIUM 5000 UNIT(S): 5000 INJECTION INTRAVENOUS; SUBCUTANEOUS at 22:51

## 2018-11-10 RX ADMIN — MORPHINE SULFATE 2 MILLIGRAM(S): 50 CAPSULE, EXTENDED RELEASE ORAL at 23:56

## 2018-11-10 RX ADMIN — PANTOPRAZOLE SODIUM 40 MILLIGRAM(S): 20 TABLET, DELAYED RELEASE ORAL at 11:15

## 2018-11-10 RX ADMIN — MORPHINE SULFATE 2 MILLIGRAM(S): 50 CAPSULE, EXTENDED RELEASE ORAL at 22:56

## 2018-11-10 RX ADMIN — DEXTROSE MONOHYDRATE, SODIUM CHLORIDE, AND POTASSIUM CHLORIDE 125 MILLILITER(S): 50; .745; 4.5 INJECTION, SOLUTION INTRAVENOUS at 17:42

## 2018-11-10 RX ADMIN — PIPERACILLIN AND TAZOBACTAM 25 GRAM(S): 4; .5 INJECTION, POWDER, LYOPHILIZED, FOR SOLUTION INTRAVENOUS at 05:48

## 2018-11-10 RX ADMIN — HEPARIN SODIUM 5000 UNIT(S): 5000 INJECTION INTRAVENOUS; SUBCUTANEOUS at 13:57

## 2018-11-10 RX ADMIN — HEPARIN SODIUM 5000 UNIT(S): 5000 INJECTION INTRAVENOUS; SUBCUTANEOUS at 05:48

## 2018-11-10 RX ADMIN — ONDANSETRON 4 MILLIGRAM(S): 8 TABLET, FILM COATED ORAL at 03:56

## 2018-11-10 RX ADMIN — PIPERACILLIN AND TAZOBACTAM 25 GRAM(S): 4; .5 INJECTION, POWDER, LYOPHILIZED, FOR SOLUTION INTRAVENOUS at 22:48

## 2018-11-10 RX ADMIN — Medication 62.5 MILLIMOLE(S): at 22:41

## 2018-11-10 RX ADMIN — PIPERACILLIN AND TAZOBACTAM 25 GRAM(S): 4; .5 INJECTION, POWDER, LYOPHILIZED, FOR SOLUTION INTRAVENOUS at 13:56

## 2018-11-10 NOTE — CONSULT NOTE ADULT - SUBJECTIVE AND OBJECTIVE BOX
REASON FOR CONSULTATION:  Hx of Left leg DVT  Intestinal obstruction.  INTERVAL HISTORY:      Allergies    No Known Allergies    Intolerances        MEDICATIONS  (STANDING):  dextrose 5% + sodium chloride 0.9% with potassium chloride 20 mEq/L 1000 milliLiter(s) (125 mL/Hr) IV Continuous <Continuous>  heparin  Injectable 5000 Unit(s) SubCutaneous every 8 hours  pantoprazole  Injectable 40 milliGRAM(s) IV Push daily  piperacillin/tazobactam IVPB. 3.375 Gram(s) IV Intermittent every 8 hours    MEDICATIONS  (PRN):  metoprolol tartrate Injectable 5 milliGRAM(s) IV Push every 6 hours PRN for BP >180/100  morphine  - Injectable 2 milliGRAM(s) IV Push every 6 hours PRN Moderate Pain (4 - 6)  ondansetron Injectable 4 milliGRAM(s) IV Push every 6 hours PRN Nausea and/or Vomiting      Vital Signs Last 24 Hrs  T(C): 37.1 (10 Nov 2018 05:44), Max: 38 (09 Nov 2018 17:44)  T(F): 98.8 (10 Nov 2018 05:44), Max: 100.4 (09 Nov 2018 17:44)  HR: 84 (10 Nov 2018 05:44) (84 - 95)  BP: 151/79 (10 Nov 2018 05:44) (140/81 - 155/86)  BP(mean): --  RR: 17 (10 Nov 2018 05:44) (17 - 17)  SpO2: 98% (10 Nov 2018 05:44) (98% - 98%)    PHYSICAL EXAM:    EYES: EOMI, PERRLA, conjunctiva and sclera clear  CHEST/LUNG: Clear to percussion bilaterally;   HEART: Regular rate and rhythm;   ABDOMEN:   Scar of Surgery.      LABS:                        10.9   13.64 )-----------( 321      ( 10 Nov 2018 06:43 )             33.8     11-10    139  |  103  |  8   ----------------------------<  128<H>  4.0   |  30  |  0.50    Ca    8.1<L>      10 Nov 2018 06:43  Phos  1.6     11-10  Mg     2.6     11-10              RADIOLOGY & ADDITIONAL STUDIES:    PATHOLOGY:

## 2018-11-10 NOTE — CONSULT NOTE ADULT - PROBLEM SELECTOR PROBLEM 2
Hypertension, unspecified type
Chronic deep vein thrombosis (DVT) of distal vein of left lower extremity

## 2018-11-10 NOTE — PROGRESS NOTE ADULT - SUBJECTIVE AND OBJECTIVE BOX
Patient seen and examined at bedside with Dr. Varma, pt resting comfortably. C/O some nausea, bloating and burping since NGT was accidentally dislodged. no flatus or BM  Abdominal pain is well controlled with pain meds  Denies chest pain, dyspnea, cough.    T(F): 99.9 (11-10-18 @ 11:49), Max: 100.4 (11-09-18 @ 17:44)  HR: 88 (11-10-18 @ 11:49) (84 - 95)  BP: 153/84 (11-10-18 @ 11:49) (140/81 - 155/86)  RR: 17 (11-10-18 @ 11:49) (17 - 17)  SpO2: 97% (11-10-18 @ 11:49) (97% - 98%)  Wt(kg): --  CAPILLARY BLOOD GLUCOSE    GENERAL: Alert, NAD. NGT secure   CHEST/LUNG: Clear to auscultation bilaterally  HEART: Regular rate and rhythm; S1 & S2 appreciated  ABDOMEN: + BS, Midline aquacel dressing with minimal sanguinous saturation. Abdomen is softly distended, +TTP around incision site. No rebound, no guarding. Blanco in place with clear yellow urine  EXTREMITIES:  no calf tenderness, No edema. SCDs on    LABS:                        10.9   13.64 )-----------( 321      ( 10 Nov 2018 06:43 )             33.8     11-10    139  |  103  |  8   ----------------------------<  128<H>  4.0   |  30  |  0.50    Ca    8.1<L>      10 Nov 2018 06:43  Phos  1.6     11-10  Mg     2.6     11-10        I&O's Detail    09 Nov 2018 07:01  -  10 Nov 2018 07:00  --------------------------------------------------------  IN:    dextrose 5% + sodium chloride 0.9% with potassium chloride 20 mEq/L: 1500 mL    Solution: 200 mL  Total IN: 1700 mL    OUT:    Indwelling Catheter - Urethral: 825 mL    Nasoenteral Tube: 50 mL  Total OUT: 875 mL    Total NET: 825 mL        Culture Results:   10,000 - 49,000 CFU/mL Yeast-like cells, presumptively not Candida  albicans (11-09 @ 04:40)  Culture Results:   Few Enterococcus faecalis (11-09 @ 01:54)      ASSESSMENT & PLAN:  58F with a PMH of HTN, HLD, DVT who presented with abdominal pain, found to have a SBO and is now POD 2 s/p Ex Lap, TIGIST, SBR with primary anastomosis.    - NGT reinsertion if pt vomits  - NPO until ROBF, D5 1/2NS + KCL  - Continue Zosyn  -  trend labs  - d/c blanco today  - no coumadin per Dr. Varma  - DVT/GI prophylaxis, Incentive Spirometer, OOB, Ambulating, pain/nausea management as needed  - discussed with Dr. Varma

## 2018-11-11 LAB
-  AMIKACIN: SIGNIFICANT CHANGE UP
-  AMOXICILLIN/CLAVULANIC ACID: SIGNIFICANT CHANGE UP
-  AMPICILLIN/SULBACTAM: SIGNIFICANT CHANGE UP
-  AMPICILLIN: SIGNIFICANT CHANGE UP
-  AZTREONAM: SIGNIFICANT CHANGE UP
-  CEFAZOLIN: SIGNIFICANT CHANGE UP
-  CEFEPIME: SIGNIFICANT CHANGE UP
-  CEFOXITIN: SIGNIFICANT CHANGE UP
-  CEFTRIAXONE: SIGNIFICANT CHANGE UP
-  CIPROFLOXACIN: SIGNIFICANT CHANGE UP
-  ERTAPENEM: SIGNIFICANT CHANGE UP
-  GENTAMICIN: SIGNIFICANT CHANGE UP
-  IMIPENEM: SIGNIFICANT CHANGE UP
-  LEVOFLOXACIN: SIGNIFICANT CHANGE UP
-  MEROPENEM: SIGNIFICANT CHANGE UP
-  PIPERACILLIN/TAZOBACTAM: SIGNIFICANT CHANGE UP
-  TOBRAMYCIN: SIGNIFICANT CHANGE UP
-  TRIMETHOPRIM/SULFAMETHOXAZOLE: SIGNIFICANT CHANGE UP
ANION GAP SERPL CALC-SCNC: 9 MMOL/L — SIGNIFICANT CHANGE UP (ref 5–17)
BUN SERPL-MCNC: 6 MG/DL — LOW (ref 7–23)
CALCIUM SERPL-MCNC: 7.9 MG/DL — LOW (ref 8.5–10.1)
CHLORIDE SERPL-SCNC: 104 MMOL/L — SIGNIFICANT CHANGE UP (ref 96–108)
CO2 SERPL-SCNC: 29 MMOL/L — SIGNIFICANT CHANGE UP (ref 22–31)
CREAT SERPL-MCNC: 0.63 MG/DL — SIGNIFICANT CHANGE UP (ref 0.5–1.3)
GLUCOSE SERPL-MCNC: 156 MG/DL — HIGH (ref 70–99)
HCT VFR BLD CALC: 32.4 % — LOW (ref 34.5–45)
HGB BLD-MCNC: 10.1 G/DL — LOW (ref 11.5–15.5)
MCHC RBC-ENTMCNC: 28.5 PG — SIGNIFICANT CHANGE UP (ref 27–34)
MCHC RBC-ENTMCNC: 31.2 GM/DL — LOW (ref 32–36)
MCV RBC AUTO: 91.3 FL — SIGNIFICANT CHANGE UP (ref 80–100)
METHOD TYPE: SIGNIFICANT CHANGE UP
NRBC # BLD: 0 /100 WBCS — SIGNIFICANT CHANGE UP (ref 0–0)
PHOSPHATE SERPL-MCNC: 2 MG/DL — LOW (ref 2.5–4.5)
PLATELET # BLD AUTO: 293 K/UL — SIGNIFICANT CHANGE UP (ref 150–400)
POTASSIUM SERPL-MCNC: 3.5 MMOL/L — SIGNIFICANT CHANGE UP (ref 3.5–5.3)
POTASSIUM SERPL-SCNC: 3.5 MMOL/L — SIGNIFICANT CHANGE UP (ref 3.5–5.3)
RBC # BLD: 3.55 M/UL — LOW (ref 3.8–5.2)
RBC # FLD: 14.5 % — SIGNIFICANT CHANGE UP (ref 10.3–14.5)
SODIUM SERPL-SCNC: 142 MMOL/L — SIGNIFICANT CHANGE UP (ref 135–145)
WBC # BLD: 10.41 K/UL — SIGNIFICANT CHANGE UP (ref 3.8–10.5)
WBC # FLD AUTO: 10.41 K/UL — SIGNIFICANT CHANGE UP (ref 3.8–10.5)

## 2018-11-11 RX ORDER — ACETAMINOPHEN 500 MG
1000 TABLET ORAL ONCE
Qty: 0 | Refills: 0 | Status: COMPLETED | OUTPATIENT
Start: 2018-11-11 | End: 2018-11-11

## 2018-11-11 RX ADMIN — HEPARIN SODIUM 5000 UNIT(S): 5000 INJECTION INTRAVENOUS; SUBCUTANEOUS at 13:56

## 2018-11-11 RX ADMIN — PANTOPRAZOLE SODIUM 40 MILLIGRAM(S): 20 TABLET, DELAYED RELEASE ORAL at 11:13

## 2018-11-11 RX ADMIN — HEPARIN SODIUM 5000 UNIT(S): 5000 INJECTION INTRAVENOUS; SUBCUTANEOUS at 22:15

## 2018-11-11 RX ADMIN — HEPARIN SODIUM 5000 UNIT(S): 5000 INJECTION INTRAVENOUS; SUBCUTANEOUS at 05:38

## 2018-11-11 RX ADMIN — PIPERACILLIN AND TAZOBACTAM 25 GRAM(S): 4; .5 INJECTION, POWDER, LYOPHILIZED, FOR SOLUTION INTRAVENOUS at 05:39

## 2018-11-11 RX ADMIN — PIPERACILLIN AND TAZOBACTAM 25 GRAM(S): 4; .5 INJECTION, POWDER, LYOPHILIZED, FOR SOLUTION INTRAVENOUS at 13:56

## 2018-11-11 RX ADMIN — Medication 62.5 MILLIMOLE(S): at 14:34

## 2018-11-11 RX ADMIN — DEXTROSE MONOHYDRATE, SODIUM CHLORIDE, AND POTASSIUM CHLORIDE 125 MILLILITER(S): 50; .745; 4.5 INJECTION, SOLUTION INTRAVENOUS at 02:40

## 2018-11-11 RX ADMIN — Medication 1000 MILLIGRAM(S): at 19:31

## 2018-11-11 RX ADMIN — PIPERACILLIN AND TAZOBACTAM 25 GRAM(S): 4; .5 INJECTION, POWDER, LYOPHILIZED, FOR SOLUTION INTRAVENOUS at 22:13

## 2018-11-11 RX ADMIN — Medication 400 MILLIGRAM(S): at 18:51

## 2018-11-11 RX ADMIN — DEXTROSE MONOHYDRATE, SODIUM CHLORIDE, AND POTASSIUM CHLORIDE 125 MILLILITER(S): 50; .745; 4.5 INJECTION, SOLUTION INTRAVENOUS at 18:52

## 2018-11-11 RX ADMIN — DEXTROSE MONOHYDRATE, SODIUM CHLORIDE, AND POTASSIUM CHLORIDE 125 MILLILITER(S): 50; .745; 4.5 INJECTION, SOLUTION INTRAVENOUS at 10:39

## 2018-11-11 NOTE — PROGRESS NOTE ADULT - SUBJECTIVE AND OBJECTIVE BOX
Patient seen and examined at bedside in no distress.  Denies abdominal pain/nausea/vomiting, but c/o no flatus.  Denies fever, chills, chest pain, sob.    Vital Signs Last 24 Hrs  T(F): 99.4 (11-11-18 @ 00:59), Max: 99.4 (11-11-18 @ 00:59)  HR: 78 (11-11-18 @ 00:59)  BP: 146/90 (11-11-18 @ 00:59)  RR: 18 (11-11-18 @ 00:59)  SpO2: 96% (11-11-18 @ 00:59)    GENERAL: Alert, oriented, NAD  CHEST/LUNG: Clear to auscultation bilaterally, respirations nonlabored  HEART: S1S2, Regular rate and rhythm  ABDOMEN: Midline aquacel with minimal staining at inferior portion, d/i. + Normoactive bowel sounds, softly distended, mild appropriate incisional tenderness, no guarding/rigidity   EXTREMITIES: No calf tenderness, no pedal edema b/l       LABS:                        10.1   10.41 )-----------( 293      ( 11 Nov 2018 07:24 )             32.4     11-11    142  |  104  |  6<L>  ----------------------------<  156<H>  3.5   |  29  |  0.63    Ca    7.9<L>      11 Nov 2018 07:24  Phos  2.0     11-11  Mg     2.6     11-10    Impression: 58F PMH HTN, DVT, HLD, a/w SBO, now POD#3 s/p ex-lap, TIGIST, SBR. Hypophosphatemia  Plan:  - replete lytes  - encouraged patient to ambulate frequently   - heme note appreciated; no therapeutic A/C needed, will continue prophylaxis  - awaiting bowel function to advance diet  - pain management PRN, antiemetics PRN, incentive spirometer  - f/u labs  - discussed with Dr. Varma

## 2018-11-12 LAB
ANION GAP SERPL CALC-SCNC: 7 MMOL/L — SIGNIFICANT CHANGE UP (ref 5–17)
BUN SERPL-MCNC: 3 MG/DL — LOW (ref 7–23)
CALCIUM SERPL-MCNC: 8.1 MG/DL — LOW (ref 8.5–10.1)
CHLORIDE SERPL-SCNC: 102 MMOL/L — SIGNIFICANT CHANGE UP (ref 96–108)
CO2 SERPL-SCNC: 30 MMOL/L — SIGNIFICANT CHANGE UP (ref 22–31)
CREAT SERPL-MCNC: 0.47 MG/DL — LOW (ref 0.5–1.3)
GLUCOSE SERPL-MCNC: 132 MG/DL — HIGH (ref 70–99)
HCT VFR BLD CALC: 31.1 % — LOW (ref 34.5–45)
HGB BLD-MCNC: 9.9 G/DL — LOW (ref 11.5–15.5)
MAGNESIUM SERPL-MCNC: 1.8 MG/DL — SIGNIFICANT CHANGE UP (ref 1.6–2.6)
MCHC RBC-ENTMCNC: 29.1 PG — SIGNIFICANT CHANGE UP (ref 27–34)
MCHC RBC-ENTMCNC: 31.8 GM/DL — LOW (ref 32–36)
MCV RBC AUTO: 91.5 FL — SIGNIFICANT CHANGE UP (ref 80–100)
NRBC # BLD: 0 /100 WBCS — SIGNIFICANT CHANGE UP (ref 0–0)
PHOSPHATE SERPL-MCNC: 1.6 MG/DL — LOW (ref 2.5–4.5)
PLATELET # BLD AUTO: 310 K/UL — SIGNIFICANT CHANGE UP (ref 150–400)
POTASSIUM SERPL-MCNC: 3.7 MMOL/L — SIGNIFICANT CHANGE UP (ref 3.5–5.3)
POTASSIUM SERPL-SCNC: 3.7 MMOL/L — SIGNIFICANT CHANGE UP (ref 3.5–5.3)
RBC # BLD: 3.4 M/UL — LOW (ref 3.8–5.2)
RBC # FLD: 14.5 % — SIGNIFICANT CHANGE UP (ref 10.3–14.5)
SODIUM SERPL-SCNC: 139 MMOL/L — SIGNIFICANT CHANGE UP (ref 135–145)
SURGICAL PATHOLOGY STUDY: SIGNIFICANT CHANGE UP
WBC # BLD: 10.62 K/UL — HIGH (ref 3.8–10.5)
WBC # FLD AUTO: 10.62 K/UL — HIGH (ref 3.8–10.5)

## 2018-11-12 RX ORDER — METRONIDAZOLE 500 MG
500 TABLET ORAL EVERY 8 HOURS
Qty: 0 | Refills: 0 | Status: DISCONTINUED | OUTPATIENT
Start: 2018-11-12 | End: 2018-11-14

## 2018-11-12 RX ORDER — CIPROFLOXACIN LACTATE 400MG/40ML
400 VIAL (ML) INTRAVENOUS EVERY 12 HOURS
Qty: 0 | Refills: 0 | Status: DISCONTINUED | OUTPATIENT
Start: 2018-11-12 | End: 2018-11-14

## 2018-11-12 RX ORDER — POTASSIUM PHOSPHATE, MONOBASIC POTASSIUM PHOSPHATE, DIBASIC 236; 224 MG/ML; MG/ML
15 INJECTION, SOLUTION INTRAVENOUS ONCE
Qty: 0 | Refills: 0 | Status: COMPLETED | OUTPATIENT
Start: 2018-11-12 | End: 2018-11-12

## 2018-11-12 RX ADMIN — HEPARIN SODIUM 5000 UNIT(S): 5000 INJECTION INTRAVENOUS; SUBCUTANEOUS at 06:00

## 2018-11-12 RX ADMIN — PIPERACILLIN AND TAZOBACTAM 25 GRAM(S): 4; .5 INJECTION, POWDER, LYOPHILIZED, FOR SOLUTION INTRAVENOUS at 05:57

## 2018-11-12 RX ADMIN — PANTOPRAZOLE SODIUM 40 MILLIGRAM(S): 20 TABLET, DELAYED RELEASE ORAL at 11:28

## 2018-11-12 RX ADMIN — DEXTROSE MONOHYDRATE, SODIUM CHLORIDE, AND POTASSIUM CHLORIDE 125 MILLILITER(S): 50; .745; 4.5 INJECTION, SOLUTION INTRAVENOUS at 11:30

## 2018-11-12 RX ADMIN — DEXTROSE MONOHYDRATE, SODIUM CHLORIDE, AND POTASSIUM CHLORIDE 125 MILLILITER(S): 50; .745; 4.5 INJECTION, SOLUTION INTRAVENOUS at 03:16

## 2018-11-12 RX ADMIN — POTASSIUM PHOSPHATE, MONOBASIC POTASSIUM PHOSPHATE, DIBASIC 63.75 MILLIMOLE(S): 236; 224 INJECTION, SOLUTION INTRAVENOUS at 20:37

## 2018-11-12 RX ADMIN — HEPARIN SODIUM 5000 UNIT(S): 5000 INJECTION INTRAVENOUS; SUBCUTANEOUS at 14:07

## 2018-11-12 RX ADMIN — HEPARIN SODIUM 5000 UNIT(S): 5000 INJECTION INTRAVENOUS; SUBCUTANEOUS at 21:49

## 2018-11-12 RX ADMIN — Medication 100 MILLIGRAM(S): at 14:08

## 2018-11-12 RX ADMIN — Medication 100 MILLIGRAM(S): at 18:31

## 2018-11-12 RX ADMIN — Medication 100 MILLIGRAM(S): at 21:48

## 2018-11-12 RX ADMIN — DEXTROSE MONOHYDRATE, SODIUM CHLORIDE, AND POTASSIUM CHLORIDE 125 MILLILITER(S): 50; .745; 4.5 INJECTION, SOLUTION INTRAVENOUS at 22:19

## 2018-11-12 NOTE — DIETITIAN INITIAL EVALUATION ADULT. - PERTINENT LABORATORY DATA
11-12 Na139 mmol/L Glu 132 mg/dL<H> K+ 3.7 mmol/L Cr  0.47 mg/dL<L> BUN 3 mg/dL<L> 11-12 Phos 1.6 mg/dL<L> 11-07 Alb 2.9 g/dL<L>11-07 ALT 18 U/L AST 13 U/L<L> Alkaline Phosphatase 65 U/L

## 2018-11-12 NOTE — DIETITIAN INITIAL EVALUATION ADULT. - NS AS NUTRI INTERV ED CONTENT
Purpose of the nutrition education/Vit K and medications; HTN nutrition therapy; Fiber restricted nutrition therapy/Survival information

## 2018-11-12 NOTE — DIETITIAN INITIAL EVALUATION ADULT. - NS AS NUTRI INTERV MEALS SNACK
Advance diet as medically feasible -> clear liquid -> low sodium , low fiber/residue restricted/General/healthful diet

## 2018-11-12 NOTE — DIETITIAN INITIAL EVALUATION ADULT. - NS AS NUTRI INTERV PARENTERAL
If NPO > 7 days, consider alternate nutrition support ie, TPN/PPN if diet cannot be advanced p.o./IV fluids

## 2018-11-12 NOTE — PROGRESS NOTE ADULT - SUBJECTIVE AND OBJECTIVE BOX
Postoperative Day #: 3  Patient seen and examined at bedside, currently washing up, +BM, +moderate abdominal pain, +flatus/BM.   Denies nausea and vomiting  Denies chest pain, dyspnea, cough.    T(F): 99 (11-12-18 @ 05:36), Max: 100.4 (11-11-18 @ 18:25)  HR: 71 (11-12-18 @ 05:36) (71 - 77)  BP: 139/75 (11-12-18 @ 05:36) (130/72 - 139/75)  RR: 18 (11-12-18 @ 05:36) (18 - 18)  SpO2: 100% (11-12-18 @ 05:36) (97% - 100%)  Wt(kg): --  CAPILLARY BLOOD GLUCOSE    PE:  GENERAL: Alert, oriented, NAD  CHEST/LUNG: Clear to auscultation bilaterally, respirations nonlabored  HEART: S1S2, Regular rate and rhythm  ABDOMEN: Midline Aquacel removed clean dry dressing reapplied, minimal staining at inferior portion, d/i. + Normoactive bowel sounds, softly distended, mild appropriate incisional tenderness, no guarding/rigidity   EXTREMITIES: No calf tenderness, no pedal edema b/l     LABS:                        9.9    10.62 )-----------( 310      ( 12 Nov 2018 07:38 )             31.1     11-12    139  |  102  |  3<L>  ----------------------------<  132<H>  3.7   |  30  |  0.47<L>    Ca    8.1<L>      12 Nov 2018 07:38  Phos  1.6     11-12  Mg     1.8     11-12        I&O's Detail    11 Nov 2018 07:01  -  12 Nov 2018 07:00  --------------------------------------------------------  IN:    dextrose 5% + sodium chloride 0.9% with potassium chloride 20 mEq/L: 1300 mL    Solution: 100 mL    Solution: 250 mL  Total IN: 1650 mL    OUT:  Total OUT: 0 mL    Total NET: 1650 mL    Culture Results:   10,000 - 49,000 CFU/mL Yeast-like cells, presumptively not Candida  albicans (11-09 @ 04:40)  Culture Results:   Few Enterococcus faecalis  Growth in fluid media only Klebsiella pneumoniae (11-09 @ 01:54)    Impression: 58F PMH HTN, DVT, HLD, a/w SBO, now POD#4 S/P ex-lap, TIGIST, SBR.   Plan:    - encouraged patient to ambulate frequently   - dvt ppx  - clear liquids  - pain management PRN, antiemetics PRN, incentive spirometer  - f/u labs, replete lytes   - discussed with Dr. Varma

## 2018-11-12 NOTE — DIETITIAN INITIAL EVALUATION ADULT. - PERTINENT MEDS FT
MEDICATIONS  (STANDING):  ciprofloxacin   IVPB 400 milliGRAM(s) IV Intermittent every 12 hours  dextrose 5% + sodium chloride 0.9% with potassium chloride 20 mEq/L 1000 milliLiter(s) (125 mL/Hr) IV Continuous <Continuous>  heparin  Injectable 5000 Unit(s) SubCutaneous every 8 hours  metroNIDAZOLE  IVPB 500 milliGRAM(s) IV Intermittent every 8 hours  pantoprazole  Injectable 40 milliGRAM(s) IV Push daily    MEDICATIONS  (PRN):  metoprolol tartrate Injectable 5 milliGRAM(s) IV Push every 6 hours PRN for BP >180/100  morphine  - Injectable 2 milliGRAM(s) IV Push every 6 hours PRN Moderate Pain (4 - 6)  ondansetron Injectable 4 milliGRAM(s) IV Push every 6 hours PRN Nausea and/or Vomiting

## 2018-11-12 NOTE — DIETITIAN INITIAL EVALUATION ADULT. - OTHER INFO
Pt seen today due to Length Of Stay x 6. Pt visiting from [place]. Lives w. her mom back home. She does the food shopping/cooking.. Last BM today 11/12 - loose w/ flatus. Denies food allergies. POD # 4 s/p exp lap, TIGIST, SBR w/ primary anastomosis, denies N/V @ this time. Partial upper dentures. IV fluids continue.

## 2018-11-13 LAB
ANION GAP SERPL CALC-SCNC: 11 MMOL/L — SIGNIFICANT CHANGE UP (ref 5–17)
BASOPHILS # BLD AUTO: 0 K/UL — SIGNIFICANT CHANGE UP (ref 0–0.2)
BASOPHILS NFR BLD AUTO: 0 % — SIGNIFICANT CHANGE UP (ref 0–2)
BUN SERPL-MCNC: 3 MG/DL — LOW (ref 7–23)
CALCIUM SERPL-MCNC: 8.1 MG/DL — LOW (ref 8.5–10.1)
CHLORIDE SERPL-SCNC: 102 MMOL/L — SIGNIFICANT CHANGE UP (ref 96–108)
CO2 SERPL-SCNC: 25 MMOL/L — SIGNIFICANT CHANGE UP (ref 22–31)
CREAT SERPL-MCNC: 0.63 MG/DL — SIGNIFICANT CHANGE UP (ref 0.5–1.3)
CULTURE RESULTS: SIGNIFICANT CHANGE UP
EOSINOPHIL # BLD AUTO: 0.11 K/UL — SIGNIFICANT CHANGE UP (ref 0–0.5)
EOSINOPHIL NFR BLD AUTO: 1 % — SIGNIFICANT CHANGE UP (ref 0–6)
GLUCOSE SERPL-MCNC: 175 MG/DL — HIGH (ref 70–99)
HCT VFR BLD CALC: 31.6 % — LOW (ref 34.5–45)
HGB BLD-MCNC: 10.2 G/DL — LOW (ref 11.5–15.5)
LYMPHOCYTES # BLD AUTO: 2.9 K/UL — SIGNIFICANT CHANGE UP (ref 1–3.3)
LYMPHOCYTES # BLD AUTO: 27 % — SIGNIFICANT CHANGE UP (ref 13–44)
MAGNESIUM SERPL-MCNC: 1.5 MG/DL — LOW (ref 1.6–2.6)
MANUAL SMEAR VERIFICATION: SIGNIFICANT CHANGE UP
MCHC RBC-ENTMCNC: 28.9 PG — SIGNIFICANT CHANGE UP (ref 27–34)
MCHC RBC-ENTMCNC: 32.3 GM/DL — SIGNIFICANT CHANGE UP (ref 32–36)
MCV RBC AUTO: 89.5 FL — SIGNIFICANT CHANGE UP (ref 80–100)
MONOCYTES # BLD AUTO: 0.11 K/UL — SIGNIFICANT CHANGE UP (ref 0–0.9)
MONOCYTES NFR BLD AUTO: 1 % — LOW (ref 2–14)
NEUTROPHILS # BLD AUTO: 7.62 K/UL — HIGH (ref 1.8–7.4)
NEUTROPHILS NFR BLD AUTO: 71 % — SIGNIFICANT CHANGE UP (ref 43–77)
NRBC # BLD: 0 /100 — SIGNIFICANT CHANGE UP (ref 0–0)
NRBC # BLD: SIGNIFICANT CHANGE UP /100 WBCS (ref 0–0)
ORGANISM # SPEC MICROSCOPIC CNT: SIGNIFICANT CHANGE UP
PHOSPHATE SERPL-MCNC: 2.2 MG/DL — LOW (ref 2.5–4.5)
PLAT MORPH BLD: NORMAL — SIGNIFICANT CHANGE UP
PLATELET # BLD AUTO: 364 K/UL — SIGNIFICANT CHANGE UP (ref 150–400)
POTASSIUM SERPL-MCNC: 3.4 MMOL/L — LOW (ref 3.5–5.3)
POTASSIUM SERPL-SCNC: 3.4 MMOL/L — LOW (ref 3.5–5.3)
RBC # BLD: 3.53 M/UL — LOW (ref 3.8–5.2)
RBC # FLD: 14.4 % — SIGNIFICANT CHANGE UP (ref 10.3–14.5)
RBC BLD AUTO: SIGNIFICANT CHANGE UP
SODIUM SERPL-SCNC: 138 MMOL/L — SIGNIFICANT CHANGE UP (ref 135–145)
SPECIMEN SOURCE: SIGNIFICANT CHANGE UP
WBC # BLD: 10.73 K/UL — HIGH (ref 3.8–10.5)
WBC # FLD AUTO: 10.73 K/UL — HIGH (ref 3.8–10.5)

## 2018-11-13 RX ORDER — POTASSIUM PHOSPHATE, MONOBASIC POTASSIUM PHOSPHATE, DIBASIC 236; 224 MG/ML; MG/ML
15 INJECTION, SOLUTION INTRAVENOUS ONCE
Qty: 0 | Refills: 0 | Status: COMPLETED | OUTPATIENT
Start: 2018-11-13 | End: 2018-11-13

## 2018-11-13 RX ORDER — SODIUM CHLORIDE 9 MG/ML
1000 INJECTION, SOLUTION INTRAVENOUS
Qty: 0 | Refills: 0 | Status: DISCONTINUED | OUTPATIENT
Start: 2018-11-13 | End: 2018-11-15

## 2018-11-13 RX ORDER — FLUCONAZOLE 150 MG/1
200 TABLET ORAL DAILY
Qty: 0 | Refills: 0 | Status: COMPLETED | OUTPATIENT
Start: 2018-11-13 | End: 2018-11-15

## 2018-11-13 RX ORDER — MAGNESIUM SULFATE 500 MG/ML
2 VIAL (ML) INJECTION ONCE
Qty: 0 | Refills: 0 | Status: COMPLETED | OUTPATIENT
Start: 2018-11-13 | End: 2018-11-13

## 2018-11-13 RX ORDER — AMLODIPINE BESYLATE 2.5 MG/1
10 TABLET ORAL DAILY
Qty: 0 | Refills: 0 | Status: DISCONTINUED | OUTPATIENT
Start: 2018-11-13 | End: 2018-11-17

## 2018-11-13 RX ADMIN — Medication 100 MILLIGRAM(S): at 21:37

## 2018-11-13 RX ADMIN — Medication 100 MILLIGRAM(S): at 06:27

## 2018-11-13 RX ADMIN — DEXTROSE MONOHYDRATE, SODIUM CHLORIDE, AND POTASSIUM CHLORIDE 125 MILLILITER(S): 50; .745; 4.5 INJECTION, SOLUTION INTRAVENOUS at 11:16

## 2018-11-13 RX ADMIN — Medication 100 MILLIGRAM(S): at 05:14

## 2018-11-13 RX ADMIN — POTASSIUM PHOSPHATE, MONOBASIC POTASSIUM PHOSPHATE, DIBASIC 63.75 MILLIMOLE(S): 236; 224 INJECTION, SOLUTION INTRAVENOUS at 14:44

## 2018-11-13 RX ADMIN — PANTOPRAZOLE SODIUM 40 MILLIGRAM(S): 20 TABLET, DELAYED RELEASE ORAL at 11:16

## 2018-11-13 RX ADMIN — HEPARIN SODIUM 5000 UNIT(S): 5000 INJECTION INTRAVENOUS; SUBCUTANEOUS at 06:28

## 2018-11-13 RX ADMIN — Medication 100 MILLIGRAM(S): at 14:45

## 2018-11-13 RX ADMIN — Medication 50 GRAM(S): at 14:44

## 2018-11-13 RX ADMIN — SODIUM CHLORIDE 75 MILLILITER(S): 9 INJECTION, SOLUTION INTRAVENOUS at 14:43

## 2018-11-13 RX ADMIN — FLUCONAZOLE 200 MILLIGRAM(S): 150 TABLET ORAL at 14:43

## 2018-11-13 RX ADMIN — Medication 100 MILLIGRAM(S): at 18:00

## 2018-11-13 RX ADMIN — HEPARIN SODIUM 5000 UNIT(S): 5000 INJECTION INTRAVENOUS; SUBCUTANEOUS at 14:45

## 2018-11-13 NOTE — PROGRESS NOTE ADULT - SUBJECTIVE AND OBJECTIVE BOX
INTERVAL HPI/OVERNIGHT EVENTS:  Pt. seen and examined at bedside resting comfortably. Patient had a tray of clear liquids for breakfast, passing little flatus. Had 2 BMs yesterday. Patient complains of abdominal pain, but improving, controlled. Denies Nausea or vomiting. Does admit to some belching.  OOB ambulating more often now. Voiding well.   Denies fever/chills, chest pain, dyspnea, cough, dizziness.     Vital Signs Last 24 Hrs  T(C): 37.1 (13 Nov 2018 05:32), Max: 37.9 (12 Nov 2018 17:46)  T(F): 98.8 (13 Nov 2018 05:32), Max: 100.2 (12 Nov 2018 17:46)  HR: 69 (13 Nov 2018 05:32) (69 - 76)  BP: 127/76 (13 Nov 2018 05:32) (120/77 - 145/77)  RR: 17 (13 Nov 2018 05:32) (17 - 20)  SpO2: 99% (13 Nov 2018 05:32) (97% - 99%)    PHYSICAL EXAM:    GENERAL: NAD  CHEST/LUNG: Clear to ausculation, bilaterally   HEART: S1S2  ABDOMEN: Softly distended, +BS, Appropriately tender to postop area, no guarding or rebound. Midline dressing changed, wound clean and dry with staple line intact. No drainage or signs of infection. Recovered with DSD.   EXTREMITIES:  calf soft, non tender b/l. 2+ distal pulses b/l.     I&O's Detail    12 Nov 2018 07:01  -  13 Nov 2018 07:00  --------------------------------------------------------  IN:    dextrose 5% + sodium chloride 0.9% with potassium chloride 20 mEq/L: 2500 mL    Solution: 200 mL    Solution: 400 mL  Total IN: 3100 mL    OUT:    Voided: 500 mL  Total OUT: 500 mL    Total NET: 2600 mL      LABS:                        10.2   10.73 )-----------( 364      ( 13 Nov 2018 08:48 )             31.6     11-13    138  |  102  |  3<L>  ----------------------------<  175<H>  3.4<L>   |  25  |  0.63    Ca    8.1<L>      13 Nov 2018 08:48  Phos  2.2     11-13  Mg     1.5     11-13      Impression: 58F PMH HTN, DVT, HLD, a/w SBO, now POD#5 S/P ex-lap, TIGIST, SBR. Pain controlled, tolerating clear liquids.   Tmax 100.2    Plan:  - Hypokalemia, Hypophosphatemia, Hypomagnesium -- lytes repleted  - Clear liquids today, monitor for GI function , IVF decreased  - continue cipro/flagyl, Diflucan added for yeast in the urine -- No ID consult at this time as per Dr. Varma   - encouraged patient to ambulate frequently, continue DVT ppx, educated on use of Incentive spirometer   - pain management PRN  - f/u labs  - medical management   - Monitor temps, vitals  - discussed with Dr. Varma

## 2018-11-14 LAB
ANION GAP SERPL CALC-SCNC: 8 MMOL/L — SIGNIFICANT CHANGE UP (ref 5–17)
BASOPHILS # BLD AUTO: 0.03 K/UL — SIGNIFICANT CHANGE UP (ref 0–0.2)
BASOPHILS NFR BLD AUTO: 0.2 % — SIGNIFICANT CHANGE UP (ref 0–2)
BUN SERPL-MCNC: 3 MG/DL — LOW (ref 7–23)
CALCIUM SERPL-MCNC: 8 MG/DL — LOW (ref 8.5–10.1)
CHLORIDE SERPL-SCNC: 99 MMOL/L — SIGNIFICANT CHANGE UP (ref 96–108)
CO2 SERPL-SCNC: 28 MMOL/L — SIGNIFICANT CHANGE UP (ref 22–31)
CREAT SERPL-MCNC: 0.55 MG/DL — SIGNIFICANT CHANGE UP (ref 0.5–1.3)
EOSINOPHIL # BLD AUTO: 0.04 K/UL — SIGNIFICANT CHANGE UP (ref 0–0.5)
EOSINOPHIL NFR BLD AUTO: 0.3 % — SIGNIFICANT CHANGE UP (ref 0–6)
GLUCOSE SERPL-MCNC: 147 MG/DL — HIGH (ref 70–99)
HCT VFR BLD CALC: 30.5 % — LOW (ref 34.5–45)
HGB BLD-MCNC: 9.9 G/DL — LOW (ref 11.5–15.5)
IMM GRANULOCYTES NFR BLD AUTO: 2.9 % — HIGH (ref 0–1.5)
LYMPHOCYTES # BLD AUTO: 1.39 K/UL — SIGNIFICANT CHANGE UP (ref 1–3.3)
LYMPHOCYTES # BLD AUTO: 11.4 % — LOW (ref 13–44)
MAGNESIUM SERPL-MCNC: 1.8 MG/DL — SIGNIFICANT CHANGE UP (ref 1.6–2.6)
MCHC RBC-ENTMCNC: 28.9 PG — SIGNIFICANT CHANGE UP (ref 27–34)
MCHC RBC-ENTMCNC: 32.5 GM/DL — SIGNIFICANT CHANGE UP (ref 32–36)
MCV RBC AUTO: 89.2 FL — SIGNIFICANT CHANGE UP (ref 80–100)
MONOCYTES # BLD AUTO: 0.65 K/UL — SIGNIFICANT CHANGE UP (ref 0–0.9)
MONOCYTES NFR BLD AUTO: 5.3 % — SIGNIFICANT CHANGE UP (ref 2–14)
NEUTROPHILS # BLD AUTO: 9.77 K/UL — HIGH (ref 1.8–7.4)
NEUTROPHILS NFR BLD AUTO: 79.9 % — HIGH (ref 43–77)
NRBC # BLD: 0 /100 WBCS — SIGNIFICANT CHANGE UP (ref 0–0)
PHOSPHATE SERPL-MCNC: 2.6 MG/DL — SIGNIFICANT CHANGE UP (ref 2.5–4.5)
PLATELET # BLD AUTO: 341 K/UL — SIGNIFICANT CHANGE UP (ref 150–400)
POTASSIUM SERPL-MCNC: 3.6 MMOL/L — SIGNIFICANT CHANGE UP (ref 3.5–5.3)
POTASSIUM SERPL-SCNC: 3.6 MMOL/L — SIGNIFICANT CHANGE UP (ref 3.5–5.3)
RBC # BLD: 3.42 M/UL — LOW (ref 3.8–5.2)
RBC # FLD: 14.2 % — SIGNIFICANT CHANGE UP (ref 10.3–14.5)
SODIUM SERPL-SCNC: 135 MMOL/L — SIGNIFICANT CHANGE UP (ref 135–145)
WBC # BLD: 12.23 K/UL — HIGH (ref 3.8–10.5)
WBC # FLD AUTO: 12.23 K/UL — HIGH (ref 3.8–10.5)

## 2018-11-14 RX ORDER — VANCOMYCIN HCL 1 G
750 VIAL (EA) INTRAVENOUS EVERY 12 HOURS
Qty: 0 | Refills: 0 | Status: DISCONTINUED | OUTPATIENT
Start: 2018-11-14 | End: 2018-11-16

## 2018-11-14 RX ORDER — ACETAMINOPHEN 500 MG
650 TABLET ORAL EVERY 6 HOURS
Qty: 0 | Refills: 0 | Status: DISCONTINUED | OUTPATIENT
Start: 2018-11-14 | End: 2018-11-17

## 2018-11-14 RX ORDER — PIPERACILLIN AND TAZOBACTAM 4; .5 G/20ML; G/20ML
3.38 INJECTION, POWDER, LYOPHILIZED, FOR SOLUTION INTRAVENOUS EVERY 8 HOURS
Qty: 0 | Refills: 0 | Status: DISCONTINUED | OUTPATIENT
Start: 2018-11-14 | End: 2018-11-15

## 2018-11-14 RX ORDER — SIMETHICONE 80 MG/1
80 TABLET, CHEWABLE ORAL EVERY 8 HOURS
Qty: 0 | Refills: 0 | Status: DISCONTINUED | OUTPATIENT
Start: 2018-11-14 | End: 2018-11-17

## 2018-11-14 RX ADMIN — Medication 100 MILLIGRAM(S): at 14:08

## 2018-11-14 RX ADMIN — HEPARIN SODIUM 5000 UNIT(S): 5000 INJECTION INTRAVENOUS; SUBCUTANEOUS at 14:09

## 2018-11-14 RX ADMIN — AMLODIPINE BESYLATE 10 MILLIGRAM(S): 2.5 TABLET ORAL at 05:36

## 2018-11-14 RX ADMIN — Medication 100 MILLIGRAM(S): at 05:38

## 2018-11-14 RX ADMIN — SODIUM CHLORIDE 75 MILLILITER(S): 9 INJECTION, SOLUTION INTRAVENOUS at 05:39

## 2018-11-14 RX ADMIN — SIMETHICONE 80 MILLIGRAM(S): 80 TABLET, CHEWABLE ORAL at 18:17

## 2018-11-14 RX ADMIN — FLUCONAZOLE 200 MILLIGRAM(S): 150 TABLET ORAL at 11:29

## 2018-11-14 RX ADMIN — Medication 250 MILLIGRAM(S): at 18:09

## 2018-11-14 RX ADMIN — PIPERACILLIN AND TAZOBACTAM 25 GRAM(S): 4; .5 INJECTION, POWDER, LYOPHILIZED, FOR SOLUTION INTRAVENOUS at 21:21

## 2018-11-14 RX ADMIN — SODIUM CHLORIDE 75 MILLILITER(S): 9 INJECTION, SOLUTION INTRAVENOUS at 18:13

## 2018-11-14 RX ADMIN — PANTOPRAZOLE SODIUM 40 MILLIGRAM(S): 20 TABLET, DELAYED RELEASE ORAL at 11:29

## 2018-11-14 NOTE — PHYSICAL THERAPY INITIAL EVALUATION ADULT - GAIT DEVIATIONS NOTED, PT EVAL
increased time in double stance/decreased velocity of limb motion/decreased stride length/decreased fatmata/decreased step length

## 2018-11-14 NOTE — CHART NOTE - NSCHARTNOTEFT_GEN_A_CORE
Upon Nutritional Assessment by the Registered Dietitian your patient was determined to meet criteria / has evidence of the following diagnosis/diagnoses:          [ ]  Mild Protein Calorie Malnutrition        [x ]  Moderate Protein Calorie Malnutrition        [ ] Severe Protein Calorie Malnutrition        [ ] Unspecified Protein Calorie Malnutrition        [ ] Underweight / BMI <19        [ ] Morbid Obesity / BMI > 40      Findings as based on:  •  Comprehensive nutrition assessment and consultation  •  Calorie counts (nutrient intake analysis)  •  Food acceptance and intake status from observations by staff  •  Follow up  •  Patient education  •  Intervention secondary to interdisciplinary rounds  •   concerns      Treatment:    The following diet has been recommended:  Adv po diet when medically feasible Full liquids to Low fiber/Ensure Enlive 2 x day(350kcal & 20gm protein)    PROVIDER Section:     By signing this assessment you are acknowledging and agree with the diagnosis/diagnoses assigned by the Registered Dietitian    Comments: Upon Nutritional Assessment by the Registered Dietitian your patient was determined to meet criteria / has evidence of the following diagnosis/diagnoses:          [ ]  Mild Protein Calorie Malnutrition        [ ]  Moderate Protein Calorie Malnutrition        [x ] Severe Protein Calorie Malnutrition        [ ] Unspecified Protein Calorie Malnutrition        [ ] Underweight / BMI <19        [ ] Morbid Obesity / BMI > 40      Findings as based on:  •  Comprehensive nutrition assessment and consultation  •  Calorie counts (nutrient intake analysis)  •  Food acceptance and intake status from observations by staff  •  Follow up  •  Patient education  •  Intervention secondary to interdisciplinary rounds  •   concerns      Treatment:    The following diet has been recommended:  Adv po diet when medically feasible Full liquids to Low fiber/Ensure Enlive 2 x day(350kcal & 20gm protein)    PROVIDER Section:     By signing this assessment you are acknowledging and agree with the diagnosis/diagnoses assigned by the Registered Dietitian    Comments:

## 2018-11-14 NOTE — PROGRESS NOTE ADULT - SUBJECTIVE AND OBJECTIVE BOX
Postoperative Day # 6  s/p  Ex lap, TIGIST, SBR    Patient seen and examined bedside resting comfortably.  Pt states she feels like there is stool in her rectum which needs to come & she cant pass it.  Abdominal pain is well controlled.  Denies nausea and vomiting. Tolerating diet.  Flatus/BM. +  Denies chest pain, dyspnea, cough.  Vital Signs Last 24 Hrs  T(C): 37.3 (14 Nov 2018 11:25), Max: 37.8 (13 Nov 2018 18:14)  T(F): 99.2 (14 Nov 2018 11:25), Max: 100.1 (13 Nov 2018 18:14)  HR: 82 (14 Nov 2018 11:25) (72 - 82)  BP: 122/73 (14 Nov 2018 11:25) (122/72 - 147/88)  BP(mean): --  RR: 16 (14 Nov 2018 11:25) (16 - 17)  SpO2: 98% (14 Nov 2018 11:25) (98% - 99%)    PHYSICAL EXAM:  General: NAD  Neuro:  Alert & oriented x 3  Abdomen: soft, incisional tenderness. Dressing dry & intact. BS+    LABS:                        9.9    12.23 )-----------( 341      ( 14 Nov 2018 07:47 )             30.5     11-14    135  |  99  |  3<L>  ----------------------------<  147<H>  3.6   |  28  |  0.55    Ca    8.0<L>      14 Nov 2018 07:47  Phos  2.6     11-14  Mg     1.8     11-14        I&O's Detail    13 Nov 2018 07:01  -  14 Nov 2018 07:00  --------------------------------------------------------  IN:    dextrose 5% + sodium chloride 0.45%.: 900 mL    Oral Fluid: 200 mL    Solution: 200 mL  Total IN: 1300 mL    OUT:    Voided: 350 mL  Total OUT: 350 mL    Total NET: 950 mL          Impression: 58y Female Postoperative Day # 6  s/p  Ex lap, TIGIST, SBR  increasing WBC  low grade temp last night    Plan:  -continue VTE prophylaxis   - continue IVAB   - continue medical f/u  -Increase activity with , OOB, Ambulate - this was stressed to the pt.  -Patient instructed on and encouraged incentive spirometry use  -continue local wound care  - encourage to drink plenty of fluids  -f/u AM labs  - advance diet as suggested by dietician.  -will discuss with Dr. Varma.

## 2018-11-14 NOTE — PHYSICAL THERAPY INITIAL EVALUATION ADULT - CRITERIA FOR SKILLED THERAPEUTIC INTERVENTIONS
therapy frequency/anticipated discharge recommendation/functional limitations in following categories/predicted duration of therapy intervention/impairments found/home with outpatient PT/risk reduction/prevention

## 2018-11-14 NOTE — CHART NOTE - NSCHARTNOTEFT_GEN_A_CORE
Assessment:  Pt seen for follow-up & NPO/Clear liquids x 8 days. Pt with small bowel obstruction & s/p small bowel resection & Exp lap 11/8. Last BM x 2(11/12) & pt with +flatus.     Factors impacting intake: [ ] none [x] nausea  [ ] vomiting [ ] diarrhea [ ] constipation  [ ]chewing problems [ ] swallowing issues  [ x] other:  Slight abdominal pain & slight distended abdomen    Diet Prescription: Diet, Clear Liquid (11-13-18 @ 08:07)    Intake: Pt NPO/Clear liquids x 8 days. Pt states drinking 2 x AJ (11/13) & sipping hot water with lemon. Pt prefers Apple Ensure Clear     Current Weight: Weight (kg): Wt=69.1kg(11/14), Wt=67.3kg(11/8);  % Weight Change  1.8kg wt gain x 6 days;  recommend reweigh to verify wt gain    Physical appearance: No edema Nutrition focused physical exam conducted; Subcutaneous fat loss: [WNL ] Orbital fat pads region, [WNL ]Buccal fat region, [Mild ]Triceps region,  [distended ]Ribs region.  Muscle wasting: [WNL ]Temples region, [WNL ]Clavicle region, [WNL ]Shoulder region, [unable ]Scapula region, [WNL ]Interosseous region,  [Mild ]thigh region, [Mild ]Calf region    Pertinent Medications: MEDICATIONS  (STANDING):  amLODIPine   Tablet 10 milliGRAM(s) Oral daily  ciprofloxacin   IVPB 400 milliGRAM(s) IV Intermittent every 12 hours  dextrose 5% + sodium chloride 0.45%. 1000 milliLiter(s) (75 mL/Hr) IV Continuous <Continuous>  fluconAZOLE   Tablet 200 milliGRAM(s) Oral daily  heparin  Injectable 5000 Unit(s) SubCutaneous every 8 hours  metroNIDAZOLE  IVPB 500 milliGRAM(s) IV Intermittent every 8 hours  pantoprazole  Injectable 40 milliGRAM(s) IV Push daily    MEDICATIONS  (PRN):  morphine  - Injectable 2 milliGRAM(s) IV Push every 6 hours PRN Moderate Pain (4 - 6)  ondansetron Injectable 4 milliGRAM(s) IV Push every 6 hours PRN Nausea and/or Vomiting    Pertinent Labs: 11-14 Na 135 mmol/L Glu 147 mg/dL<H> K+ 3.6 mmol/L Cr 0.55 mg/dL BUN 3 mg/dL<L> Phos 2.6 mg/dL Alb n/a   PAB n/a   Hgb 9.9 g/dL<L> Hct 30.5 %<L> HgA1C n/a    Glucose, Serum: 147 mg/dL <H>, WBC=12.23(11/14)   24Hr FS:  Skin: intact    Estimated Needs:   [ ] no change since previous assessment   [x ] recalculated: 1600-1800kcal(30-35kcal/kg IBW) & 60-73gm protein (1.2-1.4gm/kg IBW) & 1600-1800ml (30-35ml/kg IBW)    Previous Nutrition Diagnosis:   [ ] Inadequate Energy Intake [x ]Inadequate Oral Intake [ ] Excessive Energy Intake   [ ] Underweight [ ] Increased Nutrient Needs [ ] Overweight/Obesity   [ ] Altered GI Function [ ] Unintended Weight Loss [ ] Food & Nutrition Related Knowledge Deficit [ ] severe Malnutrition [ ] moderate malnutrition    Nutrition Diagnosis is [ ] ongoing  [ ] resolved  [ ] improved  [ x] not applicable   Previous Goal:  Pt to meet calorie/protein needs >75% nutritional needs via po or nutrition support; not met    New Nutrition Diagnosis: [ ] Inadequate Energy Intake [ ]Inadequate Oral Intake [ ] Excessive Energy Intake   [ ] Underweight [ ] Increased Nutrient Needs [ ] Overweight/Obesity   [ ] Altered GI Function [ ] Unintended Weight Loss [ ] Food & Nutrition Related Knowledge Deficit [x ] Acute moderate Malnutrition     Related to:  Inadequate energy & protein intake related to s/p SBO & Exp lap & small bowel resection 11/8    As evidenced by: Po intake <50% nutritional needs x 8 days; Mild muscle wasting    Goal: Pt to tolerate po diet without GI distress & deter unintentional wt loss       Interventions:   Recommend  [ ] Continue:  [x ] Change Diet To: Adv po diet when medically feasible Full liquids to Low fiber/Ensure Enlive 2 x day(350kcal & 20gm protein)  [ ] Nutrition Supplement:  [ ] Nutrition Support:  [ ] Other:     Monitoring and Evaluation:   [x ] PO intake [ x ] Tolerance to diet prescription [ x ] weights [ x ] labs[ x ] follow up per protocol  [ ] other: Assessment:  Pt seen for follow-up & NPO/Clear liquids x 8 days. Pt with small bowel obstruction & s/p small bowel resection & Exp lap 11/8. Last BM x 2(11/12) & pt with +flatus.     Factors impacting intake: [ ] none [x] nausea  [ ] vomiting [ ] diarrhea [ ] constipation  [ ]chewing problems [ ] swallowing issues  [ x] other:  Slight abdominal pain & slight distended abdomen    Diet Prescription: Diet, Clear Liquid (11-13-18 @ 08:07)    Intake: Pt NPO/Clear liquids x 8 days. Pt states drinking 2 x AJ (11/13) & sipping hot water with lemon. Pt prefers Apple Ensure Clear     Current Weight: Weight (kg): Wt=65.5kg(11/14), Wt=67.3kg(11/8);  % Weight Change  1.5kg wt loss (2%) x 6 days;      Physical appearance: No edema Nutrition focused physical exam conducted; Subcutaneous fat loss: [WNL ] Orbital fat pads region, [WNL ]Buccal fat region, [Mild ]Triceps region,  [distended ]Ribs region.  Muscle wasting: [WNL ]Temples region, [WNL ]Clavicle region, [WNL ]Shoulder region, [unable ]Scapula region, [WNL ]Interosseous region,  [Mild ]thigh region, [Mild ]Calf region    Pertinent Medications: MEDICATIONS  (STANDING):  amLODIPine   Tablet 10 milliGRAM(s) Oral daily  ciprofloxacin   IVPB 400 milliGRAM(s) IV Intermittent every 12 hours  dextrose 5% + sodium chloride 0.45%. 1000 milliLiter(s) (75 mL/Hr) IV Continuous <Continuous>  fluconAZOLE   Tablet 200 milliGRAM(s) Oral daily  heparin  Injectable 5000 Unit(s) SubCutaneous every 8 hours  metroNIDAZOLE  IVPB 500 milliGRAM(s) IV Intermittent every 8 hours  pantoprazole  Injectable 40 milliGRAM(s) IV Push daily    MEDICATIONS  (PRN):  morphine  - Injectable 2 milliGRAM(s) IV Push every 6 hours PRN Moderate Pain (4 - 6)  ondansetron Injectable 4 milliGRAM(s) IV Push every 6 hours PRN Nausea and/or Vomiting    Pertinent Labs: 11-14 Na 135 mmol/L Glu 147 mg/dL<H> K+ 3.6 mmol/L Cr 0.55 mg/dL BUN 3 mg/dL<L> Phos 2.6 mg/dL Alb n/a   PAB n/a   Hgb 9.9 g/dL<L> Hct 30.5 %<L> HgA1C n/a    Glucose, Serum: 147 mg/dL <H>, WBC=12.23(11/14)   24Hr FS:  Skin: intact    Estimated Needs:   [ ] no change since previous assessment   [x ] recalculated: 1600-1800kcal(30-35kcal/kg IBW) & 60-73gm protein (1.2-1.4gm/kg IBW) & 1600-1800ml (30-35ml/kg IBW)    Previous Nutrition Diagnosis:   [ ] Inadequate Energy Intake [x ]Inadequate Oral Intake [ ] Excessive Energy Intake   [ ] Underweight [ ] Increased Nutrient Needs [ ] Overweight/Obesity   [ ] Altered GI Function [ ] Unintended Weight Loss [ ] Food & Nutrition Related Knowledge Deficit [ ] severe Malnutrition [ ] moderate malnutrition    Nutrition Diagnosis is [ ] ongoing  [ ] resolved  [ ] improved  [ x] not applicable   Previous Goal:  Pt to meet calorie/protein needs >75% nutritional needs via po or nutrition support; not met    New Nutrition Diagnosis: [ ] Inadequate Energy Intake [ ]Inadequate Oral Intake [ ] Excessive Energy Intake   [ ] Underweight [ ] Increased Nutrient Needs [ ] Overweight/Obesity   [ ] Altered GI Function [ ] Unintended Weight Loss [ ] Food & Nutrition Related Knowledge Deficit [x ] Acute severe Malnutrition     Related to:  Inadequate energy & protein intake related to s/p SBO & Exp lap & small bowel resection 11/8    As evidenced by: Po intake <50% nutritional needs x 8 days; Mild muscle wasting & Sign wt loss 2% x 6 days    Goal: Pt to tolerate po diet without GI distress & deter unintentional wt loss       Interventions:   Recommend  [ ] Continue:  [x ] Change Diet To: Adv po diet when medically feasible Full liquids to Low fiber/Ensure Enlive 2 x day(350kcal & 20gm protein)  [ ] Nutrition Supplement:  [ ] Nutrition Support:  [ ] Other:     Monitoring and Evaluation:   [x ] PO intake [ x ] Tolerance to diet prescription [ x ] weights [ x ] labs[ x ] follow up per protocol  [ ] other: Assessment:  Pt seen for follow-up & NPO/Clear liquids x 8 days. Pt with small bowel obstruction & s/p small bowel resection & Exp lap 11/8. Last BM x 2(11/12) & pt with +flatus.     Factors impacting intake: [ ] none [x] nausea  [ ] vomiting [ ] diarrhea [ ] constipation  [ ]chewing problems [ ] swallowing issues  [ x] other:  Slight abdominal pain & slight distended abdomen    Diet Prescription: Diet, Clear Liquid (11-13-18 @ 08:07)    Intake: Pt NPO/Clear liquids x 8 days. Pt states drinking 2 x AJ (11/13) & sipping hot water with lemon. Pt prefers Apple Ensure Clear     Current Weight: Weight (kg): Wt=65.5kg(11/14), Wt=67.3kg(11/8);  % Weight Change  1.5kg wt loss (2%) x 6 days;      Physical appearance: No edema Nutrition focused physical exam conducted; Subcutaneous fat loss: [WNL ] Orbital fat pads region, [WNL ]Buccal fat region, [Mild ]Triceps region,  [distended ]Ribs region.  Muscle wasting: [WNL ]Temples region, [WNL ]Clavicle region, [WNL ]Shoulder region, [unable ]Scapula region, [WNL ]Interosseous region,  [Mild ]thigh region, [Mild ]Calf region    Pertinent Medications: MEDICATIONS  (STANDING):  amLODIPine   Tablet 10 milliGRAM(s) Oral daily  ciprofloxacin   IVPB 400 milliGRAM(s) IV Intermittent every 12 hours  dextrose 5% + sodium chloride 0.45%. 1000 milliLiter(s) (75 mL/Hr) IV Continuous <Continuous>  fluconAZOLE   Tablet 200 milliGRAM(s) Oral daily  heparin  Injectable 5000 Unit(s) SubCutaneous every 8 hours  metroNIDAZOLE  IVPB 500 milliGRAM(s) IV Intermittent every 8 hours  pantoprazole  Injectable 40 milliGRAM(s) IV Push daily    MEDICATIONS  (PRN):  morphine  - Injectable 2 milliGRAM(s) IV Push every 6 hours PRN Moderate Pain (4 - 6)  ondansetron Injectable 4 milliGRAM(s) IV Push every 6 hours PRN Nausea and/or Vomiting    Pertinent Labs: 11-14 Na 135 mmol/L Glu 147 mg/dL<H> K+ 3.6 mmol/L Cr 0.55 mg/dL BUN 3 mg/dL<L> Phos 2.6 mg/dL Alb n/a   PAB n/a   Hgb 9.9 g/dL<L> Hct 30.5 %<L> HgA1C n/a    Glucose, Serum: 147 mg/dL <H>, WBC=12.23(11/14)   24Hr FS:  Skin: intact    Estimated Needs:   [ ] no change since previous assessment   [x ] recalculated: 1600-1800kcal(30-35kcal/kg IBW) & 60-73gm protein (1.2-1.4gm/kg IBW) & 1600-1800ml (30-35ml/kg IBW)    Previous Nutrition Diagnosis:   [ ] Inadequate Energy Intake [x ]Inadequate Oral Intake [ ] Excessive Energy Intake   [ ] Underweight [ ] Increased Nutrient Needs [ ] Overweight/Obesity   [ ] Altered GI Function [ ] Unintended Weight Loss [ ] Food & Nutrition Related Knowledge Deficit [ ] severe Malnutrition [ ] moderate malnutrition    Nutrition Diagnosis is [ ] ongoing  [ ] resolved  [ ] improved  [ x] not applicable   Previous Goal:  Pt to meet calorie/protein needs >75% nutritional needs via po or nutrition support; not met    New Nutrition Diagnosis: [ ] Inadequate Energy Intake [ ]Inadequate Oral Intake [ ] Excessive Energy Intake   [ ] Underweight [ ] Increased Nutrient Needs [ ] Overweight/Obesity   [ ] Altered GI Function [ ] Unintended Weight Loss [ ] Food & Nutrition Related Knowledge Deficit [x ] Acute severe Malnutrition     Related to:  Inadequate energy & protein intake related to s/p SBO & Exp lap & small bowel resection 11/8    As evidenced by: Po intake <50% nutritional needs x 8 days; Mild muscle wasting & Sign wt loss 2% x 6 days    Goal: Pt to tolerate po diet without GI distress & deter unintentional wt loss       Interventions:   Recommend  [ ] Continue:  [x ] Change Diet To: Adv po diet when medically feasible Full liquids to Low fiber/Ensure Enlive 2 x day(350kcal & 20gm protein)  [ ] Nutrition Supplement:  [X ] Nutrition Support: Consider PPN if pt remains NPO/Clear liquids  [ ] Other:     Monitoring and Evaluation:   [x ] PO intake [ x ] Tolerance to diet prescription [ x ] weights [ x ] labs[ x ] follow up per protocol  [ ] other:

## 2018-11-15 LAB
ANION GAP SERPL CALC-SCNC: 8 MMOL/L — SIGNIFICANT CHANGE UP (ref 5–17)
BUN SERPL-MCNC: 3 MG/DL — LOW (ref 7–23)
CALCIUM SERPL-MCNC: 8.2 MG/DL — LOW (ref 8.5–10.1)
CHLORIDE SERPL-SCNC: 97 MMOL/L — SIGNIFICANT CHANGE UP (ref 96–108)
CO2 SERPL-SCNC: 29 MMOL/L — SIGNIFICANT CHANGE UP (ref 22–31)
CREAT SERPL-MCNC: 0.61 MG/DL — SIGNIFICANT CHANGE UP (ref 0.5–1.3)
GLUCOSE SERPL-MCNC: 183 MG/DL — HIGH (ref 70–99)
HCT VFR BLD CALC: 32.1 % — LOW (ref 34.5–45)
HGB BLD-MCNC: 10.5 G/DL — LOW (ref 11.5–15.5)
MCHC RBC-ENTMCNC: 29 PG — SIGNIFICANT CHANGE UP (ref 27–34)
MCHC RBC-ENTMCNC: 32.7 GM/DL — SIGNIFICANT CHANGE UP (ref 32–36)
MCV RBC AUTO: 88.7 FL — SIGNIFICANT CHANGE UP (ref 80–100)
NRBC # BLD: 0 /100 WBCS — SIGNIFICANT CHANGE UP (ref 0–0)
PLATELET # BLD AUTO: 401 K/UL — HIGH (ref 150–400)
POTASSIUM SERPL-MCNC: 3.6 MMOL/L — SIGNIFICANT CHANGE UP (ref 3.5–5.3)
POTASSIUM SERPL-SCNC: 3.6 MMOL/L — SIGNIFICANT CHANGE UP (ref 3.5–5.3)
RBC # BLD: 3.62 M/UL — LOW (ref 3.8–5.2)
RBC # FLD: 14.2 % — SIGNIFICANT CHANGE UP (ref 10.3–14.5)
SODIUM SERPL-SCNC: 134 MMOL/L — LOW (ref 135–145)
WBC # BLD: 12.4 K/UL — HIGH (ref 3.8–10.5)
WBC # FLD AUTO: 12.4 K/UL — HIGH (ref 3.8–10.5)

## 2018-11-15 RX ORDER — MEROPENEM 1 G/30ML
INJECTION INTRAVENOUS
Qty: 0 | Refills: 0 | Status: DISCONTINUED | OUTPATIENT
Start: 2018-11-15 | End: 2018-11-16

## 2018-11-15 RX ORDER — MEROPENEM 1 G/30ML
1000 INJECTION INTRAVENOUS EVERY 8 HOURS
Qty: 0 | Refills: 0 | Status: DISCONTINUED | OUTPATIENT
Start: 2018-11-15 | End: 2018-11-16

## 2018-11-15 RX ORDER — FLUCONAZOLE 150 MG/1
200 TABLET ORAL DAILY
Qty: 0 | Refills: 0 | Status: DISCONTINUED | OUTPATIENT
Start: 2018-11-15 | End: 2018-11-17

## 2018-11-15 RX ORDER — MEROPENEM 1 G/30ML
1000 INJECTION INTRAVENOUS ONCE
Qty: 0 | Refills: 0 | Status: COMPLETED | OUTPATIENT
Start: 2018-11-15 | End: 2018-11-15

## 2018-11-15 RX ADMIN — PIPERACILLIN AND TAZOBACTAM 25 GRAM(S): 4; .5 INJECTION, POWDER, LYOPHILIZED, FOR SOLUTION INTRAVENOUS at 06:07

## 2018-11-15 RX ADMIN — MEROPENEM 100 MILLIGRAM(S): 1 INJECTION INTRAVENOUS at 11:24

## 2018-11-15 RX ADMIN — MEROPENEM 100 MILLIGRAM(S): 1 INJECTION INTRAVENOUS at 21:23

## 2018-11-15 RX ADMIN — Medication 650 MILLIGRAM(S): at 11:59

## 2018-11-15 RX ADMIN — Medication 250 MILLIGRAM(S): at 06:41

## 2018-11-15 RX ADMIN — FLUCONAZOLE 200 MILLIGRAM(S): 150 TABLET ORAL at 11:26

## 2018-11-15 RX ADMIN — Medication 250 MILLIGRAM(S): at 17:30

## 2018-11-15 RX ADMIN — PANTOPRAZOLE SODIUM 40 MILLIGRAM(S): 20 TABLET, DELAYED RELEASE ORAL at 11:26

## 2018-11-15 RX ADMIN — Medication 650 MILLIGRAM(S): at 11:29

## 2018-11-15 RX ADMIN — HEPARIN SODIUM 5000 UNIT(S): 5000 INJECTION INTRAVENOUS; SUBCUTANEOUS at 06:10

## 2018-11-15 RX ADMIN — Medication 650 MILLIGRAM(S): at 06:04

## 2018-11-15 RX ADMIN — SODIUM CHLORIDE 75 MILLILITER(S): 9 INJECTION, SOLUTION INTRAVENOUS at 08:51

## 2018-11-15 RX ADMIN — Medication 650 MILLIGRAM(S): at 22:04

## 2018-11-15 RX ADMIN — SIMETHICONE 80 MILLIGRAM(S): 80 TABLET, CHEWABLE ORAL at 11:26

## 2018-11-15 RX ADMIN — HEPARIN SODIUM 5000 UNIT(S): 5000 INJECTION INTRAVENOUS; SUBCUTANEOUS at 21:23

## 2018-11-15 RX ADMIN — SODIUM CHLORIDE 75 MILLILITER(S): 9 INJECTION, SOLUTION INTRAVENOUS at 06:07

## 2018-11-15 RX ADMIN — AMLODIPINE BESYLATE 10 MILLIGRAM(S): 2.5 TABLET ORAL at 06:04

## 2018-11-15 RX ADMIN — HEPARIN SODIUM 5000 UNIT(S): 5000 INJECTION INTRAVENOUS; SUBCUTANEOUS at 13:44

## 2018-11-15 RX ADMIN — Medication 650 MILLIGRAM(S): at 21:24

## 2018-11-15 NOTE — PROGRESS NOTE ADULT - SUBJECTIVE AND OBJECTIVE BOX
Patient seen and examined bedside resting comfortably.  Patient admits to incision site abdominal pain. Abdominal pain is well controlled on medication.  Patient admits to chills.  Denies nausea and vomiting. Tolerating diet. +flatus and BM.  Denies chest pain, dyspnea, cough.    T(F): 99.4 (11-15-18 @ 05:28), Max: 100.1 (18 @ 16:47)  HR: 75 (11-15-18 @ 05:28) (74 - 98)  BP: 119/74 (11-15-18 @ 05:28) (112/67 - 127/79)  RR: 16 (11-15-18 @ 05:28) (16 - 18)  SpO2: 96% (11-15-18 @ 05:28) (96% - 99%)      PHYSICAL EXAM:  General: NAD  Heart: S1S2, RRR  Lungs: lungs clear to auscultation B/L, nonlabored respirations  Abdomen: soft, NTND. Normactive BS  Extremities: calf soft, nontender, no edema noted    LABS:                        10.5   12.40 )-----------( 401      ( 15 Nov 2018 08:13 )             32.1     11-15    134<L>  |  97  |  3<L>  ----------------------------<  183<H>  3.6   |  29  |  0.61    Ca    8.2<L>      15 Nov 2018 08:13  Phos  2.6       Mg     1.8             I&O's Detail    2018 07:01  -  15 2018 07:00  --------------------------------------------------------  IN:    dextrose 5% + sodium chloride 0.45%.: 1300 mL    Solution: 175 mL    Solution: 100 mL    Solution: 250 mL  Total IN: 1825 mL    OUT:    Voided: 300 mL  Total OUT: 300 mL    Total NET: 1525 mL          Impression: 58F with a PMH of HLD, HTN,  27 years ago, DVT on Coumadin x5 years, admitted with SBO, s/p ex lap, TIGIST, SBR, POD #7    Plan:  -continue vanco and merrem per ID  -continue diflucan for a total of 7 days per ID  -continue low fiber diet as tolerated  -continue medical management/supportive care  -DVT prophylaxis with Heparin, encourage patient to ambulate as tolerated, encourage IS  -dressing taken down today and cleaned with chlorhexidine. Staples to be removed at f/u appt.  -will discuss with Dr. Avani De La Cruz PA-S    I have examined this patient. I agree with the above.  J.M.Behan, PA-C

## 2018-11-15 NOTE — CONSULT NOTE ADULT - SUBJECTIVE AND OBJECTIVE BOX
59 yo woman presents c/o worsening abdominal pain over the last 4-5 days. This has been accompanied with an increase in the size of her abdomen. Pain has been to 4-5/10 in intensity. She said she has vomited "multiple times". She said the fluid was green.    H/O  27 years ago.    No similar episodes in the past.    Pt is in the  on an extended vacation from [place]. (2018 16:08)      PAST MEDICAL & SURGICAL HISTORY:  HLD (hyperlipidemia)  HTN (hypertension)  DVT (deep venous thrombosis)  History of       SOCHX:   tobacco,  -  alcohol    FMHX: FA/MO  - contributory       Recent Travel:    Immunizations:    Allergies    No Known Allergies    Intolerances        MEDICATIONS  (STANDING):  amLODIPine   Tablet 10 milliGRAM(s) Oral daily  dextrose 5% + sodium chloride 0.45%. 1000 milliLiter(s) (75 mL/Hr) IV Continuous <Continuous>  fluconAZOLE   Tablet 200 milliGRAM(s) Oral daily  heparin  Injectable 5000 Unit(s) SubCutaneous every 8 hours  meropenem  IVPB      pantoprazole  Injectable 40 milliGRAM(s) IV Push daily  vancomycin  IVPB 750 milliGRAM(s) IV Intermittent every 12 hours      REVIEW OF SYSTEMS:  IN PROGRESS     VITAL SIGNS:    T(C): 37.4 (11-15-18 @ 05:28), Max: 37.8 (18 @ 16:47)  T(F): 99.4 (11-15-18 @ 05:28), Max: 100.1 (18 @ 16:47)  HR: 75 (11-15-18 @ 05:28) (74 - 98)  BP: 119/74 (11-15-18 @ 05:28) (112/67 - 127/79)  RR: 16 (11-15-18 @ 05:28) (16 - 18)  SpO2: 96% (11-15-18 @ 05:28) (96% - 99%)    PHYSICAL EXAM:    GENERAL: IN PROGRESS   HEAD:  Atraumatic, Normocephalic  EYES: EOMI, PERRLA, conjunctiva and sclera clear  ENMT: No tonsillar erythema, exudates, or enlargement; Moist mucous membranes, Good dentition, No lesions  NECK: Supple, No JVD, Normal thyroid  NERVOUS SYSTEM:  Alert & Oriented X3, Good concentration  CHEST/LUNG: Clear bilaterally; No rales, rhonchi, wheezing bilaterally  HEART: Regular rate and rhythm; No murmurs, rubs, or gallops  ABDOMEN:   EXTREMITIES:  2+ Peripheral Pulses, No clubbing, cyanosis, or edema  LYMPH: No lymphadenopathy noted  SKIN: No rashes or lesions      LABS:                         10.5   12.40 )-----------( 401      ( 15 Nov 2018 08:13 )             32.1     11-15    134<L>  |  97  |  3<L>  ----------------------------<  183<H>  3.6   |  29  |  0.61    Ca    8.2<L>      15 Nov 2018 08:13  Phos  2.6       Mg     1.8                                     Culture Results:   10,000 - 49,000 CFU/mL Yeast-like cells, presumptively not Candida  albicans ( @ 04:40)  Culture Results:   Few Enterococcus faecalis  Growth in fluid media only Klebsiella pneumoniae ( @ 01:54)                Radiology: 57 yo woman presents c/o worsening abdominal pain over the last 4-5 days. This has been accompanied with an increase in the size of her abdomen. Pain has been to 4-5/10 in intensity. She said she has vomited "multiple times". She said the fluid was green.    H/O  27 years ago.    No similar episodes in the past.    Pt is in the  on an extended vacation from [place]. (2018 16:08)      PAST MEDICAL & SURGICAL HISTORY:  HLD (hyperlipidemia)  HTN (hypertension)  DVT (deep venous thrombosis)  History of       SOCHX:   tobacco,  -  alcohol    FMHX: FA/MO  - contributory       Recent Travel:    Immunizations:    Allergies    No Known Allergies    Intolerances        MEDICATIONS  (STANDING):  amLODIPine   Tablet 10 milliGRAM(s) Oral daily  dextrose 5% + sodium chloride 0.45%. 1000 milliLiter(s) (75 mL/Hr) IV Continuous <Continuous>  fluconAZOLE   Tablet 200 milliGRAM(s) Oral daily  heparin  Injectable 5000 Unit(s) SubCutaneous every 8 hours  meropenem  IVPB      pantoprazole  Injectable 40 milliGRAM(s) IV Push daily  vancomycin  IVPB 750 milliGRAM(s) IV Intermittent every 12 hours      REVIEW OF SYSTEMS:  burning urination and rt lower quadrat abdominal pain ++     VITAL SIGNS:    T(C): 37.4 (11-15-18 @ 05:28), Max: 37.8 (18 @ 16:47)  T(F): 99.4 (11-15-18 @ 05:28), Max: 100.1 (18 @ 16:47)  HR: 75 (11-15-18 @ 05:28) (74 - 98)  BP: 119/74 (11-15-18 @ 05:28) (112/67 - 127/79)  RR: 16 (11-15-18 @ 05:28) (16 - 18)  SpO2: 96% (11-15-18 @ 05:28) (96% - 99%)    PHYSICAL EXAM:    GENERAL: slowly ambulating , not in an ydistress   HEAD:  Atraumatic, Normocephalic  EYES: EOMI, PERRLA, conjunctiva and sclera clear  ENMT: No tonsillar erythema, exudates, or enlargement; Moist mucous membranes, Good dentition, No lesions  NECK: Supple, No JVD, Normal thyroid  NERVOUS SYSTEM:  Alert & Oriented X3, Good concentration  CHEST/LUNG: Clear bilaterally; No rales, rhonchi, wheezing bilaterally  HEART: Regular rate and rhythm; No murmurs, rubs, or gallops  ABDOMEN: vertical midline stitches ok, no discharge. ++ Rt LQ tenderness +++    EXTREMITIES:  2+ Peripheral Pulses, No clubbing, cyanosis, or edema  LYMPH: No lymphadenopathy noted  SKIN: No rashes or lesions      LABS:                         10.5   12.40 )-----------( 401      ( 15 Nov 2018 08:13 )             32.1     11-15    134<L>  |  97  |  3<L>  ----------------------------<  183<H>  3.6   |  29  |  0.61    Ca    8.2<L>      15 Nov 2018 08:13  Phos  2.6     11-14  Mg     1.8     11-14                                Culture Results:   10,000 - 49,000 CFU/mL Yeast-like cells, presumptively not Candida  albicans ( @ 04:40)  Culture Results:   Few Enterococcus faecalis  Growth in fluid media only Klebsiella pneumoniae ( @ 01:54)                Radiology:

## 2018-11-15 NOTE — CONSULT NOTE ADULT - REASON FOR ADMISSION
Pt has abdominal pain associated with N/V

## 2018-11-15 NOTE — CONSULT NOTE ADULT - ASSESSMENT
SBO, now POD#5 S/P ex-lap  Low grade fevers and leukocytosis     s/p zosyn, cipro and flagyl   Abdominal wound culture with klebsiella resistent to zosyn and enterococcus fecalis sensitive to ampicillin   will switch zosyn to meropenem as klebsiella is intermediate to zosyn   added vanco yesterday for enterococcus   blood cultures so far neg   urine culture with candida albicans , UA also pos for yeast , on diflucan   NOTE TO CONTINUE   EVALUATION IN PROGRESS  PATIENT TO BE SEEN SBO, now POD#5 S/P ex-lap  Low grade fevers and leukocytosis , leukemoid reaction ? from surgery ? vs Sepsis     s/p zosyn, cipro and flagyl   Abdominal wound culture with klebsiella which is Not sensitive to zosyn and enterococcus fecalis sensitive to ampicillin   will switch zosyn to meropenem as klebsiella is intermediate to zosyn   added vanco yesterday for enterococcus   we will narrow the spectrum when leukocytosis and LGF better   blood cultures so far neg   urine culture with candida albicans , UA also pos for yeast , pt has symptoms, started diflucan , please complete 7 days   will narrow the spectrum if she cotninue to improve   cxrr ok, no cough or diarrhea    we will FU   Thanks

## 2018-11-16 LAB
ANION GAP SERPL CALC-SCNC: 11 MMOL/L — SIGNIFICANT CHANGE UP (ref 5–17)
BASOPHILS # BLD AUTO: 0.02 K/UL — SIGNIFICANT CHANGE UP (ref 0–0.2)
BASOPHILS NFR BLD AUTO: 0.2 % — SIGNIFICANT CHANGE UP (ref 0–2)
BUN SERPL-MCNC: 7 MG/DL — SIGNIFICANT CHANGE UP (ref 7–23)
CALCIUM SERPL-MCNC: 8.4 MG/DL — LOW (ref 8.5–10.1)
CHLORIDE SERPL-SCNC: 99 MMOL/L — SIGNIFICANT CHANGE UP (ref 96–108)
CO2 SERPL-SCNC: 28 MMOL/L — SIGNIFICANT CHANGE UP (ref 22–31)
CREAT SERPL-MCNC: 0.66 MG/DL — SIGNIFICANT CHANGE UP (ref 0.5–1.3)
EOSINOPHIL # BLD AUTO: 0.21 K/UL — SIGNIFICANT CHANGE UP (ref 0–0.5)
EOSINOPHIL NFR BLD AUTO: 1.7 % — SIGNIFICANT CHANGE UP (ref 0–6)
GLUCOSE SERPL-MCNC: 136 MG/DL — HIGH (ref 70–99)
HCT VFR BLD CALC: 30.4 % — LOW (ref 34.5–45)
HGB BLD-MCNC: 10 G/DL — LOW (ref 11.5–15.5)
IMM GRANULOCYTES NFR BLD AUTO: 1.9 % — HIGH (ref 0–1.5)
LYMPHOCYTES # BLD AUTO: 1.47 K/UL — SIGNIFICANT CHANGE UP (ref 1–3.3)
LYMPHOCYTES # BLD AUTO: 11.8 % — LOW (ref 13–44)
MCHC RBC-ENTMCNC: 29.2 PG — SIGNIFICANT CHANGE UP (ref 27–34)
MCHC RBC-ENTMCNC: 32.9 GM/DL — SIGNIFICANT CHANGE UP (ref 32–36)
MCV RBC AUTO: 88.6 FL — SIGNIFICANT CHANGE UP (ref 80–100)
MONOCYTES # BLD AUTO: 0.5 K/UL — SIGNIFICANT CHANGE UP (ref 0–0.9)
MONOCYTES NFR BLD AUTO: 4 % — SIGNIFICANT CHANGE UP (ref 2–14)
NEUTROPHILS # BLD AUTO: 9.98 K/UL — HIGH (ref 1.8–7.4)
NEUTROPHILS NFR BLD AUTO: 80.4 % — HIGH (ref 43–77)
NRBC # BLD: 0 /100 WBCS — SIGNIFICANT CHANGE UP (ref 0–0)
PLATELET # BLD AUTO: 384 K/UL — SIGNIFICANT CHANGE UP (ref 150–400)
POTASSIUM SERPL-MCNC: 3.8 MMOL/L — SIGNIFICANT CHANGE UP (ref 3.5–5.3)
POTASSIUM SERPL-SCNC: 3.8 MMOL/L — SIGNIFICANT CHANGE UP (ref 3.5–5.3)
RBC # BLD: 3.43 M/UL — LOW (ref 3.8–5.2)
RBC # FLD: 14.4 % — SIGNIFICANT CHANGE UP (ref 10.3–14.5)
SODIUM SERPL-SCNC: 138 MMOL/L — SIGNIFICANT CHANGE UP (ref 135–145)
VANCOMYCIN TROUGH SERPL-MCNC: 5.9 UG/ML — LOW (ref 10–20)
WBC # BLD: 12.41 K/UL — HIGH (ref 3.8–10.5)
WBC # FLD AUTO: 12.41 K/UL — HIGH (ref 3.8–10.5)

## 2018-11-16 RX ORDER — VANCOMYCIN HCL 1 G
1250 VIAL (EA) INTRAVENOUS EVERY 12 HOURS
Qty: 0 | Refills: 0 | Status: DISCONTINUED | OUTPATIENT
Start: 2018-11-16 | End: 2018-11-16

## 2018-11-16 RX ORDER — PIPERACILLIN AND TAZOBACTAM 4; .5 G/20ML; G/20ML
3.38 INJECTION, POWDER, LYOPHILIZED, FOR SOLUTION INTRAVENOUS EVERY 8 HOURS
Qty: 0 | Refills: 0 | Status: DISCONTINUED | OUTPATIENT
Start: 2018-11-16 | End: 2018-11-17

## 2018-11-16 RX ADMIN — MEROPENEM 100 MILLIGRAM(S): 1 INJECTION INTRAVENOUS at 05:25

## 2018-11-16 RX ADMIN — HEPARIN SODIUM 5000 UNIT(S): 5000 INJECTION INTRAVENOUS; SUBCUTANEOUS at 14:16

## 2018-11-16 RX ADMIN — FLUCONAZOLE 200 MILLIGRAM(S): 150 TABLET ORAL at 12:06

## 2018-11-16 RX ADMIN — AMLODIPINE BESYLATE 10 MILLIGRAM(S): 2.5 TABLET ORAL at 05:25

## 2018-11-16 RX ADMIN — PIPERACILLIN AND TAZOBACTAM 25 GRAM(S): 4; .5 INJECTION, POWDER, LYOPHILIZED, FOR SOLUTION INTRAVENOUS at 21:08

## 2018-11-16 RX ADMIN — MEROPENEM 100 MILLIGRAM(S): 1 INJECTION INTRAVENOUS at 14:15

## 2018-11-16 RX ADMIN — Medication 250 MILLIGRAM(S): at 06:40

## 2018-11-16 RX ADMIN — PANTOPRAZOLE SODIUM 40 MILLIGRAM(S): 20 TABLET, DELAYED RELEASE ORAL at 12:05

## 2018-11-16 RX ADMIN — HEPARIN SODIUM 5000 UNIT(S): 5000 INJECTION INTRAVENOUS; SUBCUTANEOUS at 21:08

## 2018-11-16 NOTE — PROGRESS NOTE ADULT - SUBJECTIVE AND OBJECTIVE BOX
58F with a     HPI:  59YO female presents c/o worsening abdominal pain over the last 4-5 days. This has been accompanied with an increase in the size of her abdomen. Pain has been to 4-5/10 in intensity. She said she has vomited "multiple times". She said the fluid was green.  work up consistent with   with SBO, s/p ex lap, TIGIST, SBR, POD #8  H/O  27 years ago.    No similar episodes in the past.    Pt is in the  on an extended vacation from [place]. (2018 16:08)      PAST MEDICAL & SURGICAL HISTORY:  HLD (hyperlipidemia)  HTN (hypertension)  DVT (deep venous thrombosis)  History of       Immunizations:    Allergies    No Known Allergies    Intolerances        MEDICATIONS  (STANDING):  amLODIPine   Tablet 10 milliGRAM(s) Oral daily  fluconAZOLE   Tablet 200 milliGRAM(s) Oral daily  heparin  Injectable 5000 Unit(s) SubCutaneous every 8 hours  meropenem  IVPB      meropenem  IVPB 1000 milliGRAM(s) IV Intermittent every 8 hours  pantoprazole  Injectable 40 milliGRAM(s) IV Push daily  vancomycin  IVPB 1250 milliGRAM(s) IV Intermittent every 12 hours    MEDICATIONS  (PRN):  acetaminophen   Tablet .. 650 milliGRAM(s) Oral every 6 hours PRN Mild Pain (1 - 3)  ondansetron Injectable 4 milliGRAM(s) IV Push every 6 hours PRN Nausea and/or Vomiting  simethicone 80 milliGRAM(s) Chew every 8 hours PRN Gas      REVIEW OF SYSTEMS:  CONSTITUTIONAL: No fever, weight loss, or fatigue  EYES: No eye pain, visual disturbances, or discharge  ENMT:  No difficulty hearing, tinnitus, vertigo; No sinus or throat pain  NECK: No pain or stiffness  BREASTS: No pain, masses, or nipple discharge  RESPIRATORY: No cough, wheezing, chills or hemoptysis; No shortness of breath  CARDIOVASCULAR: No chest pain, palpitations, dizziness, or leg swelling  GASTROINTESTINAL: No abdominal or epigastric pain. No nausea, vomiting, or hematemesis; No diarrhea or constipation. No melena or hematochezia.  GENITOURINARY: No dysuria, frequency, hematuria, or incontinence  NEUROLOGICAL: No headaches, memory loss, loss of strength, numbness, or tremors  SKIN: No itching, burning, rashes, or lesions   LYMPH NODES: No enlarged glands  ENDOCRINE: No heat or cold intolerance; No hair loss  MUSCULOSKELETAL: No joint pain or swelling; No muscle, back, or extremity pain  PSYCHIATRIC: No depression, anxiety, mood swings, or difficulty sleeping  HEME/LYMPH: No easy bruising, or bleeding gums  ALLERGY AND IMMUNOLOGIC: No hives or eczema    VITAL SIGNS:    T(C): 37.4 (18 @ 06:00), Max: 37.6 (11-15-18 @ 17:37)  T(F): 99.3 (18 @ 06:00), Max: 99.7 (11-15-18 @ 17:37)  HR: 107 (18 @ 14:15) (80 - 107)  BP: 151/83 (18 @ 14:15) (116/69 - 151/83)  RR: 19 (18 @ 14:15) (16 - 20)  SpO2: 98% (18 @ 14:15) (96% - 98%)    PHYSICAL EXAM:    GENERAL: NAD, well-groomed, well-developed  HEAD:  Atraumatic, Normocephalic  EYES: EOMI, PERRLA, conjunctiva and sclera clear  ENMT: No tonsillar erythema, exudates, or enlargement; Moist mucous membranes, Good dentition, No lesions  NECK: Supple, No JVD, Normal thyroid  NERVOUS SYSTEM:  Alert & Oriented X3, Good concentration; Motor Strength 5/5 B/L upper and lower extremities; DTRs 2+ intact and symmetric  CHEST/LUNG: Clear to auscultation bilaterally; No rales, rhonchi, wheezing bilaterally  HEART: Regular rate and rhythm; No murmurs, rubs, or gallops  ABDOMEN: Soft, Nontender, Nondistended; Bowel sounds present  EXTREMITIES:  2+ Peripheral Pulses, No clubbing, cyanosis, or edema  LYMPH: No lymphadenopathy noted  SKIN: No rashes or lesions  BACK: no pressor sore     LABS:                         10.0   12.41 )-----------( 384      ( 2018 07:06 )             30.4     -    138  |  99  |  7   ----------------------------<  136<H>  3.8   |  28  |  0.66    Ca    8.4<L>      2018 07:06                          Vancomycin Level, Trough: 5.9 ug/mL ( @ 07:06)                      Radiology: 59YO female presents c/o worsening abdominal pain over the last 4-5 days. This has been accompanied with an increase in the size of her abdomen. Pain has been to 4-5/10 in intensity. She said she has vomited "multiple times". She said the fluid was green.  work up consistent with   with SBO, s/p ex lap, TIGIST, SBR, POD #8  H/O  27 years ago.  feels fine   No similar episodes in the past.    Pt is in the  on an extended vacation from [place]. (2018 16:08)      PAST MEDICAL & SURGICAL HISTORY:  HLD (hyperlipidemia)  HTN (hypertension)  DVT (deep venous thrombosis)  History of       Immunizations:    Allergies    No Known Allergies    Intolerances        MEDICATIONS  (STANDING):  amLODIPine   Tablet 10 milliGRAM(s) Oral daily  fluconAZOLE   Tablet 200 milliGRAM(s) Oral daily  heparin  Injectable 5000 Unit(s) SubCutaneous every 8 hours  meropenem  IVPB      meropenem  IVPB 1000 milliGRAM(s) IV Intermittent every 8 hours  pantoprazole  Injectable 40 milliGRAM(s) IV Push daily  vancomycin  IVPB 1250 milliGRAM(s) IV Intermittent every 12 hours    MEDICATIONS  (PRN):  acetaminophen   Tablet .. 650 milliGRAM(s) Oral every 6 hours PRN Mild Pain (1 - 3)  ondansetron Injectable 4 milliGRAM(s) IV Push every 6 hours PRN Nausea and/or Vomiting  simethicone 80 milliGRAM(s) Chew every 8 hours PRN Gas      REVIEW OF SYSTEMS:  CONSTITUTIONAL: No fever, weight loss, or fatigue  EYES: No eye pain, visual disturbances, or discharge  ENMT:  No difficulty hearing, tinnitus, vertigo; No sinus or throat pain  NECK: No pain or stiffness  BREASTS: No pain, masses, or nipple discharge  RESPIRATORY: No cough, wheezing, chills or hemoptysis; No shortness of breath  CARDIOVASCULAR: No chest pain, palpitations, dizziness, or leg swelling  GASTROINTESTINAL: No abdominal or epigastric pain. No nausea, vomiting, or hematemesis;   poor appetite   no abdominal pain   GENITOURINARY: No dysuria, frequency, hematuria, or incontinence  NEUROLOGICAL: No headaches, memory loss, loss of strength, numbness, or tremors  SKIN: No itching, burning, rashes, or lesions   LYMPH NODES: No enlarged glands  ENDOCRINE: No heat or cold intolerance; No hair loss  MUSCULOSKELETAL: No joint pain or swelling; No muscle, back, or extremity pain  PSYCHIATRIC: No depression, anxiety, mood swings, or difficulty sleeping  HEME/LYMPH: No easy bruising, or bleeding gums  ALLERGY AND IMMUNOLOGIC: No hives or eczema    VITAL SIGNS:    T(C): 37.4 (18 @ 06:00), Max: 37.6 (11-15-18 @ 17:37)  T(F): 99.3 (18 @ 06:00), Max: 99.7 (11-15-18 @ 17:37)  HR: 107 (18 @ 14:15) (80 - 107)  BP: 151/83 (18 @ 14:15) (116/69 - 151/83)  RR: 19 (18 @ 14:15) (16 - 20)  SpO2: 98% (18 @ 14:15) (96% - 98%)    PHYSICAL EXAM:    GENERAL: NAD, well-groomed, well-developed  HEAD:  Atraumatic, Normocephalic  EYES: EOMI, PERRLA, conjunctiva and sclera clear  ENMT:  Moist mucous membranes, Good dentition, No lesions  NECK: Supple, No JVD, Normal thyroid  NERVOUS SYSTEM:  Alert & Oriented X3, Good concentration;   CHEST/LUNG: Clear to auscultation bilaterally; No rales, rhonchi, wheezing bilaterally  HEART: Regular rate and rhythm; No murmurs, rubs, or gallops  ABDOMEN: Soft, Nontender, Nondistended; Bowel sounds present  no pow infection  EXTREMITIES:  2+ Peripheral Pulses, No clubbing, cyanosis, or edema  LYMPH: No lymphadenopathy noted  SKIN: No rashes or lesions  BACK: no pressor sore     LABS:                         10.0   12.41 )-----------( 384      ( 2018 07:06 )             30.4         138  |  99  |  7   ----------------------------<  136<H>  3.8   |  28  |  0.66    Ca    8.4<L>      2018 07:06                          Vancomycin Level, Trough: 5.9 ug/mL ( @ 07:06)                      Radiology:

## 2018-11-16 NOTE — PROGRESS NOTE ADULT - ASSESSMENT
PMH of HLD, HTN,  27 years ago, DVT on Coumadin x5 years, admitted with SBO, s/p ex lap, TIGIST, SBR, POD #8  tolerating po   oral switch acceptable   cipro 500 bid and augmentin 875 bid cover most oral  mild increased in wbc PMH of HLD, HTN,  27 years ago, DVT on Coumadin x5 years, admitted with SBO, s/p ex lap, TIGIST, SBR, POD #8  tolerating po but eating very little as no appetite   oral switch acceptable   cipro 500 bid and augmentin 875 bid cover most oral  mild increased in wbc    follow up cbc am

## 2018-11-16 NOTE — PROGRESS NOTE ADULT - SUBJECTIVE AND OBJECTIVE BOX
Patient seen and examined bedside resting comfortably.  No new complaints offered. States she feels "much better" than when she came in.  Abdominal pain is well controlled on medication.  Denies nausea and vomiting. Tolerating diet. +BM. +Flatus.  Patient is ambulating well.   Denies chest pain, dyspnea, cough.    T(F): 99.3 (18 @ 06:00), Max: 99.7 (11-15-18 @ 17:37)  HR: 81 (18 @ 06:00) (80 - 91)  BP: 116/69 (18 @ 06:00) (116/69 - 141/72)  RR: 16 (18 @ 06:00) (15 - 20)  SpO2: 97% (18 @ 06:00) (96% - 100%)      PHYSICAL EXAM:  General: alert and awake, NAD  Heart: S1S2  Lungs: nonlabored respirations  Abdomen: soft, nondistended. Incision site clean and dry. No drainage or erythema noted.  Extremities: calf soft, nontender    LABS:                        10.0   12.41 )-----------( 384      ( 2018 07:06 )             30.4     11-16    138  |  99  |  7   ----------------------------<  136<H>  3.8   |  28  |  0.66    Ca    8.4<L>      2018 07:06        I&O's Detail    15 Nov 2018 07:01  -  2018 07:00  --------------------------------------------------------  IN:    dextrose 5% + sodium chloride 0.45%.: 375 mL    Solution: 150 mL    Solution: 500 mL  Total IN: 1025 mL    OUT:  Total OUT: 0 mL    Total NET: 1025 mL      Impression: 58F with a PMH of HLD, HTN,  27 years ago, DVT on Coumadin x5 years, admitted with SBO, s/p ex lap, TIGIST, SBR, POD #8    Plan:  -continue vanco and merrem per ID, will discuss possible change of IV abx to PO abx with ID  -continue diflucan for a total of 7 days per ID  -daily wound check  -continue low fiber diet as tolerated  -continue medical management/supportive care  -DVT prophylaxis with Heparin, encourage patient to ambulate as tolerated, encourage IS  -f/u AM labs  -will discuss with Dr. Avani De La Cruz, PA-S    I have examined this patient. I agree with the above.  J.M.Behan, PA-C

## 2018-11-17 VITALS
DIASTOLIC BLOOD PRESSURE: 79 MMHG | OXYGEN SATURATION: 99 % | HEART RATE: 87 BPM | SYSTOLIC BLOOD PRESSURE: 134 MMHG | TEMPERATURE: 100 F | RESPIRATION RATE: 16 BRPM

## 2018-11-17 LAB
ANION GAP SERPL CALC-SCNC: 9 MMOL/L — SIGNIFICANT CHANGE UP (ref 5–17)
BUN SERPL-MCNC: 6 MG/DL — LOW (ref 7–23)
CALCIUM SERPL-MCNC: 8.1 MG/DL — LOW (ref 8.5–10.1)
CHLORIDE SERPL-SCNC: 99 MMOL/L — SIGNIFICANT CHANGE UP (ref 96–108)
CO2 SERPL-SCNC: 30 MMOL/L — SIGNIFICANT CHANGE UP (ref 22–31)
CREAT SERPL-MCNC: 0.68 MG/DL — SIGNIFICANT CHANGE UP (ref 0.5–1.3)
GLUCOSE SERPL-MCNC: 158 MG/DL — HIGH (ref 70–99)
HCT VFR BLD CALC: 29.6 % — LOW (ref 34.5–45)
HGB BLD-MCNC: 9.5 G/DL — LOW (ref 11.5–15.5)
MAGNESIUM SERPL-MCNC: 1.7 MG/DL — SIGNIFICANT CHANGE UP (ref 1.6–2.6)
MCHC RBC-ENTMCNC: 28.6 PG — SIGNIFICANT CHANGE UP (ref 27–34)
MCHC RBC-ENTMCNC: 32.1 GM/DL — SIGNIFICANT CHANGE UP (ref 32–36)
MCV RBC AUTO: 89.2 FL — SIGNIFICANT CHANGE UP (ref 80–100)
NRBC # BLD: 0 /100 WBCS — SIGNIFICANT CHANGE UP (ref 0–0)
PHOSPHATE SERPL-MCNC: 2.4 MG/DL — LOW (ref 2.5–4.5)
PHOSPHATE SERPL-MCNC: 3.8 MG/DL — SIGNIFICANT CHANGE UP (ref 2.5–4.5)
PLATELET # BLD AUTO: 411 K/UL — HIGH (ref 150–400)
POTASSIUM SERPL-MCNC: 3.5 MMOL/L — SIGNIFICANT CHANGE UP (ref 3.5–5.3)
POTASSIUM SERPL-SCNC: 3.5 MMOL/L — SIGNIFICANT CHANGE UP (ref 3.5–5.3)
RBC # BLD: 3.32 M/UL — LOW (ref 3.8–5.2)
RBC # FLD: 14.6 % — HIGH (ref 10.3–14.5)
SODIUM SERPL-SCNC: 138 MMOL/L — SIGNIFICANT CHANGE UP (ref 135–145)
WBC # BLD: 11.59 K/UL — HIGH (ref 3.8–10.5)
WBC # FLD AUTO: 11.59 K/UL — HIGH (ref 3.8–10.5)

## 2018-11-17 RX ORDER — SODIUM,POTASSIUM PHOSPHATES 278-250MG
1 POWDER IN PACKET (EA) ORAL
Qty: 0 | Refills: 0 | Status: DISCONTINUED | OUTPATIENT
Start: 2018-11-17 | End: 2018-11-17

## 2018-11-17 RX ORDER — WARFARIN SODIUM 2.5 MG/1
1 TABLET ORAL
Qty: 0 | Refills: 0 | COMMUNITY

## 2018-11-17 RX ORDER — POTASSIUM PHOSPHATE, MONOBASIC POTASSIUM PHOSPHATE, DIBASIC 236; 224 MG/ML; MG/ML
15 INJECTION, SOLUTION INTRAVENOUS ONCE
Qty: 0 | Refills: 0 | Status: COMPLETED | OUTPATIENT
Start: 2018-11-17 | End: 2018-11-17

## 2018-11-17 RX ORDER — FLUCONAZOLE 150 MG/1
1 TABLET ORAL
Qty: 5 | Refills: 0 | OUTPATIENT
Start: 2018-11-17 | End: 2018-11-21

## 2018-11-17 RX ORDER — CIPROFLOXACIN LACTATE 400MG/40ML
1 VIAL (ML) INTRAVENOUS
Qty: 10 | Refills: 0 | OUTPATIENT
Start: 2018-11-17 | End: 2018-11-21

## 2018-11-17 RX ADMIN — AMLODIPINE BESYLATE 10 MILLIGRAM(S): 2.5 TABLET ORAL at 05:29

## 2018-11-17 RX ADMIN — FLUCONAZOLE 200 MILLIGRAM(S): 150 TABLET ORAL at 11:59

## 2018-11-17 RX ADMIN — POTASSIUM PHOSPHATE, MONOBASIC POTASSIUM PHOSPHATE, DIBASIC 63.75 MILLIMOLE(S): 236; 224 INJECTION, SOLUTION INTRAVENOUS at 12:19

## 2018-11-17 RX ADMIN — Medication 650 MILLIGRAM(S): at 05:28

## 2018-11-17 RX ADMIN — Medication 650 MILLIGRAM(S): at 06:02

## 2018-11-17 RX ADMIN — SIMETHICONE 80 MILLIGRAM(S): 80 TABLET, CHEWABLE ORAL at 10:36

## 2018-11-17 RX ADMIN — Medication 1 TABLET(S): at 11:59

## 2018-11-17 RX ADMIN — Medication 1 TABLET(S): at 17:08

## 2018-11-17 RX ADMIN — HEPARIN SODIUM 5000 UNIT(S): 5000 INJECTION INTRAVENOUS; SUBCUTANEOUS at 13:06

## 2018-11-17 RX ADMIN — PANTOPRAZOLE SODIUM 40 MILLIGRAM(S): 20 TABLET, DELAYED RELEASE ORAL at 11:59

## 2018-11-17 RX ADMIN — HEPARIN SODIUM 5000 UNIT(S): 5000 INJECTION INTRAVENOUS; SUBCUTANEOUS at 05:29

## 2018-11-17 RX ADMIN — PIPERACILLIN AND TAZOBACTAM 25 GRAM(S): 4; .5 INJECTION, POWDER, LYOPHILIZED, FOR SOLUTION INTRAVENOUS at 05:29

## 2018-11-17 NOTE — DISCHARGE NOTE ADULT - CARE PLAN
Principal Discharge DX:	SBO (small bowel obstruction)  Goal:	Post-op care  Assessment and plan of treatment:	Follow up in 1 week. Please call the office to make an appointment. Activity as tolerated. Rest as needed. You may eat a low fiber diet. Do not lift anything heavier than 10 pounds. You may take motrin or advil for mild pain as needed, in addition to prescribed narcotic medication. Do not drive while taking narcotic pain medication. You may take over the counter stool softeners as needed. Call for any fever over 101, nausea, vomiting, severe pain, no passing of gas or bowel movement. You may shower and pat dry abdomen.  Secondary Diagnosis:	Essential hypertension  Assessment and plan of treatment:	Follow up with your primary care physician.  Secondary Diagnosis:	Hyperlipidemia, unspecified hyperlipidemia type  Assessment and plan of treatment:	Follow up with your primary care physician.  Secondary Diagnosis:	DVT (deep venous thrombosis)  Assessment and plan of treatment:	History of Chronic deep vein thrombosis (DVT) of distal vein of left lower extremity. Doppler:- Negative for DVT. No Need to Use Coumadin per Dr. Mcmanus hematologist. Follow up with your primary care physician. Principal Discharge DX:	SBO (small bowel obstruction)  Goal:	Post-op care  Assessment and plan of treatment:	Follow up in 1 week. Please call the office to make an appointment. Activity as tolerated. Rest as needed. You may eat a low fiber diet. Do not lift anything heavier than 10 pounds. You may take motrin or advil for mild pain as needed, in addition to prescribed narcotic medication. Do not drive while taking narcotic pain medication. You may take over the counter stool softeners as needed. Call for any fever over 101, nausea, vomiting, severe pain, no passing of gas or bowel movement. You may shower and pat dry abdomen.  Secondary Diagnosis:	Essential hypertension  Assessment and plan of treatment:	Follow up with your primary care physician.  Secondary Diagnosis:	Hyperlipidemia, unspecified hyperlipidemia type  Assessment and plan of treatment:	Follow up with your primary care physician.  Secondary Diagnosis:	DVT (deep venous thrombosis)  Assessment and plan of treatment:	History of Chronic deep vein thrombosis (DVT) of distal vein of left lower extremity. Doppler:- Negative for DVT. No Need to Use Coumadin per Dr. Mcmanus hematologist. Follow up with your primary care physician as soon as possible.

## 2018-11-17 NOTE — PROGRESS NOTE ADULT - SUBJECTIVE AND OBJECTIVE BOX
SURGERY PROGRESS HPI:  Pt seen and examined at bedside. Denies pain or complaints. No acute events overnight. Pt tolerating LRD well. Patient ate everything on her tray for breakfast. Pt denies nausea and vomiting. +BM/flatus. Had a BM this morning. Voiding well. Pt denies chest pain, SOB, dizziness, fever, chills. Ambulating.    Vital Signs Last 24 Hrs  T(C): 37.2 (2018 05:53), Max: 38.2 (2018 17:11)  T(F): 99 (2018 05:53), Max: 100.8 (2018 17:11)  HR: 82 (2018 05:53) (82 - 107)  BP: 135/77 (2018 05:53) (118/73 - 151/83)  RR: 18 (2018 05:53) (16 - 19)  SpO2: 99% (2018 05:53) (97% - 99%)      PHYSICAL EXAM:    GENERAL: NAD  CHEST/LUNG: Clear to ausculation, bilaterally   HEART: RRR S1S2  ABDOMEN: Staple line is clean/dry/intact, non distended, +BS, soft, non tender, no guarding  EXTREMITIES:  calf soft, non tender b/l    I&O's Detail    2018 07:01  -  2018 07:00  --------------------------------------------------------  IN:    Oral Fluid: 360 mL    Solution: 200 mL    Solution: 50 mL  Total IN: 610 mL    OUT:  Total OUT: 0 mL    Total NET: 610 mL    LABS:                        9.5    11.59 )-----------( 411      ( 2018 07:40 )             29.6         138  |  99  |  6<L>  ----------------------------<  158<H>  3.5   |  30  |  0.68    Ca    8.1<L>      2018 07:40  Phos  2.4       Mg     1.7           Impression: 58F with a PMH of HLD, HTN,  27 years ago, DVT on Coumadin x5 years, admitted with SBO, s/p ex lap, TIGIST, SBR, POD #9    Plan:  -continue low fiber diet  -DVT prophylaxis, encourage patient to ambulate as tolerated, encourage IS  -Discussed patient with Dr. Palacios. Will d/c patient today on Cipro 500 BID, Augmentin 875 BID and Diflucan 200 for a total of 2 weeks from patient's surgery.  -Discussed patient with Dr. Varma. Will d/c patient since ok per ID.

## 2018-11-17 NOTE — CHART NOTE - NSCHARTNOTEFT_GEN_A_CORE
Pt c small bowel obstruction ; s/p small bowel resection 11/8. Pt was NPO/Clears x 8 days prior to advancement to solid diet on 11/14.     Factors impacting intake: [X ] none [ ] nausea  [ ] vomiting [ ] diarrhea [ ] constipation  [ ]chewing problems [ ] swallowing issues  [ ] other:     Diet Prescription: Diet, Low Fiber:   Supplement Feeding Modality:  Oral  Ensure Enlive Cans or Servings Per Day:  2       Frequency:  Two Times a day (11-14-18 @ 13:44)    Intake: appetite is good; 60 - 100% most meals; drinking supplement    Current Weight: Weight (kg): 64.2 (11/17); previous wt 65.5 kg (11/14); admission wt 67.3 kg on admission (11/8)  % Weight Change; 5% loss x 11 days    Pertinent Medications: MEDICATIONS  (STANDING):  amLODIPine   Tablet 10 milliGRAM(s) Oral daily  fluconAZOLE   Tablet 200 milliGRAM(s) Oral daily  heparin  Injectable 5000 Unit(s) SubCutaneous every 8 hours  pantoprazole  Injectable 40 milliGRAM(s) IV Push daily  piperacillin/tazobactam IVPB. 3.375 Gram(s) IV Intermittent every 8 hours  potassium acid phosphate/sodium acid phosphate tablet (K-PHOS No. 2) 1 Tablet(s) Oral four times a day with meals    MEDICATIONS  (PRN):  acetaminophen   Tablet .. 650 milliGRAM(s) Oral every 6 hours PRN Mild Pain (1 - 3)  ondansetron Injectable 4 milliGRAM(s) IV Push every 6 hours PRN Nausea and/or Vomiting  simethicone 80 milliGRAM(s) Chew every 8 hours PRN Gas    Pertinent Labs: 11-17 Na138 mmol/L Glu 158 mg/dL<H> K+ 3.5 mmol/L Cr  0.68 mg/dL BUN 6 mg/dL<L> 11-17 Phos 2.4 mg/dL<L>    Skin: WDL; no edema noted    Estimated Needs:   [X ] no change since previous assessment (11/14)  [ ] recalculated:     Previous Nutrition Diagnosis:   [ ] Inadequate Energy Intake [ ]Inadequate Oral Intake [ ] Excessive Energy Intake   [ ] Underweight [ ] Increased Nutrient Needs [ ] Overweight/Obesity   [ ] Altered GI Function [ ] Unintended Weight Loss [ ] Food & Nutrition Related Knowledge Deficit   [X ] Severe Malnutrition - acute (based on mild muscle wasting noted on 11/14 f/u note + wt loss of 5% x 11 days)     Nutrition Diagnosis is [X ] ongoing  [ ] resolved [ ] not applicable     GOAL: Pt to tolerate  diet without GI distress (met) & deter further wt loss (not met)    New Nutrition Diagnosis: [X ] not applicable      Interventions: continue current diet rx as noted  Recommend  [ ] Change Diet To:  [ ] Nutrition Supplement  [ ] Nutrition Support  [ ] Other:     Monitoring and Evaluation:   [X ] PO intake [ x ] Tolerance to diet prescription [ x ] weights [ x ] labs[ x ] follow up per protocol  [ ] other:

## 2018-11-17 NOTE — DISCHARGE NOTE ADULT - MEDICATION SUMMARY - MEDICATIONS TO STOP TAKING
I will STOP taking the medications listed below when I get home from the hospital:    Coumadin 10 mg oral tablet  -- 1 tab(s) by mouth once a day

## 2018-11-17 NOTE — PROGRESS NOTE ADULT - PROVIDER SPECIALTY LIST ADULT
Infectious Disease
Internal Medicine
Surgery

## 2018-11-17 NOTE — DISCHARGE NOTE ADULT - PATIENT PORTAL LINK FT
You can access the [facility] Patient Portal, offered by [facility], by registering with the following website: [email or web address]

## 2018-11-17 NOTE — DISCHARGE NOTE ADULT - PLAN OF CARE
Post-op care Follow up in 1 week. Please call the office to make an appointment. Activity as tolerated. Rest as needed. You may eat a low fiber diet. Do not lift anything heavier than 10 pounds. You may take motrin or advil for mild pain as needed, in addition to prescribed narcotic medication. Do not drive while taking narcotic pain medication. You may take over the counter stool softeners as needed. Call for any fever over 101, nausea, vomiting, severe pain, no passing of gas or bowel movement. You may shower and pat dry abdomen. Follow up with your primary care physician. History of Chronic deep vein thrombosis (DVT) of distal vein of left lower extremity. Doppler:- Negative for DVT. No Need to Use Coumadin per Dr. Mcmanus hematologist. Follow up with your primary care physician. History of Chronic deep vein thrombosis (DVT) of distal vein of left lower extremity. Doppler:- Negative for DVT. No Need to Use Coumadin per Dr. Mcmanus hematologist. Follow up with your primary care physician as soon as possible.

## 2018-11-17 NOTE — DISCHARGE NOTE ADULT - CARE PROVIDER_API CALL
Isabel Varma), Surgery  [address] [facility] [facility] [address]  [address] [place] [address]  Phone: [phone]  Fax: [phone]

## 2018-11-17 NOTE — DISCHARGE NOTE ADULT - HOSPITAL COURSE
58F with a PMH of HLD, HTN,  27 years ago, DVT on Coumadin x5 years, admitted with SBO, s/p ex lap, TIGIST, SBR. Operation went well. Patient tolerated advancement of diet and had bowel movements. Patient was seen by medicine, ID, and heme. No need for AC for DVT 5 years ago per heme.     At the time of discharge, the patient was hemodynamically stable, was tolerating PO diet, was voiding urine, was ambulating, and was comfortable with adequate pain control. No nausea, vomiting, fever, chills. Patient instructed to follow up and to call the office to make an appointment. Patient instructed to call for any fever over 101, nausea, vomiting, severe pain, no passing of gas or bowel movement. Patient understood.

## 2018-11-17 NOTE — PROGRESS NOTE ADULT - REASON FOR ADMISSION
Pt has abdominal pain associated with N/V

## 2018-11-17 NOTE — DISCHARGE NOTE ADULT - MEDICATION SUMMARY - MEDICATIONS TO TAKE
I will START or STAY ON the medications listed below when I get home from the hospital:    oxyCODONE-acetaminophen 5 mg-325 mg oral tablet  -- 1 tab(s) by mouth every 6 hours, As Needed -for severe pain MDD:4  -- Caution federal law prohibits the transfer of this drug to any person other  than the person for whom it was prescribed.  May cause drowsiness.  Alcohol may intensify this effect.  Use care when operating dangerous machinery.  This prescription cannot be refilled.  This product contains acetaminophen.  Do not use  with any other product containing acetaminophen to prevent possible liver damage.  Using more of this medication than prescribed may cause serious breathing problems.    -- Indication: For Severe pain    fluconazole 200 mg oral tablet  -- 1 tab(s) by mouth once a day   -- Do not take this drug if you are pregnant.  Finish all this medication unless otherwise directed by prescriber.    -- Indication: For Antifungal    amLODIPine 10 mg oral tablet  -- 1 tab(s) by mouth once a day  -- Indication: For HTN (hypertension)    amoxicillin-clavulanate 875 mg-125 mg oral tablet  -- 1 tab(s) by mouth 2 times a day   -- Finish all this medication unless otherwise directed by prescriber.  Take with food or milk.    -- Indication: For Antibiotic    ciprofloxacin 500 mg oral tablet  -- 1 tab(s) by mouth 2 times a day   -- Avoid prolonged or excessive exposure to direct and/or artificial sunlight while taking this medication.  Check with your doctor before becoming pregnant.  Do not take dairy products, antacids, or iron preparations within one hour of this medication.  Finish all this medication unless otherwise directed by prescriber.  Medication should be taken with plenty of water.    -- Indication: For Antibiotic

## 2018-11-24 DIAGNOSIS — E87.6 HYPOKALEMIA: ICD-10-CM

## 2018-11-24 DIAGNOSIS — K43.0 INCISIONAL HERNIA WITH OBSTRUCTION, WITHOUT GANGRENE: ICD-10-CM

## 2018-11-24 DIAGNOSIS — I82.5Z2 CHRONIC EMBOLISM AND THROMBOSIS OF UNSPECIFIED DEEP VEINS OF LEFT DISTAL LOWER EXTREMITY: ICD-10-CM

## 2018-11-24 DIAGNOSIS — E43 UNSPECIFIED SEVERE PROTEIN-CALORIE MALNUTRITION: ICD-10-CM

## 2018-11-24 DIAGNOSIS — E78.5 HYPERLIPIDEMIA, UNSPECIFIED: ICD-10-CM

## 2018-11-24 DIAGNOSIS — K56.609 UNSPECIFIED INTESTINAL OBSTRUCTION, UNSPECIFIED AS TO PARTIAL VERSUS COMPLETE OBSTRUCTION: ICD-10-CM

## 2018-11-24 DIAGNOSIS — E83.41 HYPERMAGNESEMIA: ICD-10-CM

## 2018-11-24 DIAGNOSIS — Z79.01 LONG TERM (CURRENT) USE OF ANTICOAGULANTS: ICD-10-CM

## 2018-11-24 DIAGNOSIS — I10 ESSENTIAL (PRIMARY) HYPERTENSION: ICD-10-CM

## 2018-11-24 DIAGNOSIS — N17.9 ACUTE KIDNEY FAILURE, UNSPECIFIED: ICD-10-CM

## 2021-09-28 NOTE — ED PROVIDER NOTE - RESPIRATORY NEGATIVE STATEMENT, MLM
no chest pain, no cough, and no shortness of breath. Azithromycin Pregnancy And Lactation Text: This medication is considered safe during pregnancy and is also secreted in breast milk.

## 2023-05-17 NOTE — ED ADULT NURSE NOTE - CAS TRG GENERAL NORM CIRC DET
Strong peripheral pulses I, Marie Avilez MD, have reviewed the ACP's documentation. After personally examining the patient, getting an independent history, and formulating an MDM, my findings have been added to this documentation as indicated.

## 2024-03-29 NOTE — H&P ADULT - PROBLEM/PLAN-3
Problem: Adult Inpatient Plan of Care  Goal: Plan of Care Review  Outcome: Ongoing, Progressing  Goal: Patient-Specific Goal (Individualized)  Outcome: Ongoing, Progressing  Goal: Absence of Hospital-Acquired Illness or Injury  Outcome: Ongoing, Progressing  Intervention: Identify and Manage Fall Risk  Recent Flowsheet Documentation  Taken 3/29/2024 0400 by John Jansen RN  Safety Promotion/Fall Prevention:   assistive device/personal items within reach   clutter free environment maintained   safety round/check completed  Taken 3/29/2024 0200 by John Jansen RN  Safety Promotion/Fall Prevention:   assistive device/personal items within reach   clutter free environment maintained   safety round/check completed  Taken 3/29/2024 0000 by John Jansen RN  Safety Promotion/Fall Prevention:   assistive device/personal items within reach   clutter free environment maintained   safety round/check completed  Taken 3/28/2024 2200 by John Jansen RN  Safety Promotion/Fall Prevention:   clutter free environment maintained   assistive device/personal items within reach   safety round/check completed  Taken 3/28/2024 2043 by John Jansen RN  Safety Promotion/Fall Prevention:   clutter free environment maintained   assistive device/personal items within reach   safety round/check completed  Intervention: Prevent Skin Injury  Recent Flowsheet Documentation  Taken 3/29/2024 0400 by John Jansen RN  Body Position: position changed independently  Taken 3/29/2024 0200 by John Jansen RN  Body Position: position changed independently  Taken 3/29/2024 0000 by John Jansen RN  Body Position: position changed independently  Skin Protection: adhesive use limited  Taken 3/28/2024 2200 by John Jansen RN  Body Position: position changed independently  Taken 3/28/2024 2043 by John Jansen RN  Body Position: position changed independently  Skin Protection: adhesive use limited  Intervention: Prevent and  Manage VTE (Venous Thromboembolism) Risk  Recent Flowsheet Documentation  Taken 3/29/2024 0400 by John Jansen RN  Activity Management: up ad jude  Taken 3/29/2024 0200 by John Jansen RN  Activity Management: up ad jude  Taken 3/29/2024 0000 by John Jansen RN  Activity Management: up ad jude  Taken 3/28/2024 2200 by John Jansen RN  Activity Management: activity encouraged  Taken 3/28/2024 2043 by John Jansen RN  Activity Management: activity encouraged  Range of Motion: active ROM (range of motion) encouraged  Intervention: Prevent Infection  Recent Flowsheet Documentation  Taken 3/29/2024 0400 by John Jansen RN  Infection Prevention:   environmental surveillance performed   hand hygiene promoted   rest/sleep promoted  Taken 3/29/2024 0200 by John Jansen RN  Infection Prevention:   environmental surveillance performed   hand hygiene promoted   rest/sleep promoted  Taken 3/29/2024 0000 by John Jansen RN  Infection Prevention:   environmental surveillance performed   hand hygiene promoted   rest/sleep promoted  Taken 3/28/2024 2200 by John Jansen RN  Infection Prevention:   environmental surveillance performed   hand hygiene promoted   rest/sleep promoted  Taken 3/28/2024 2043 by John Jansen RN  Infection Prevention:   environmental surveillance performed   hand hygiene promoted   rest/sleep promoted  Goal: Optimal Comfort and Wellbeing  Outcome: Ongoing, Progressing  Intervention: Provide Person-Centered Care  Recent Flowsheet Documentation  Taken 3/28/2024 2043 by John Jansen RN  Trust Relationship/Rapport: care explained  Goal: Readiness for Transition of Care  Outcome: Ongoing, Progressing   Goal Outcome Evaluation:                                               DISPLAY PLAN FREE TEXT

## 2025-01-22 NOTE — PATIENT PROFILE ADULT - NSTRANSFERDENTURES_GEN_A_NUR
LVM for patient to call back and schedule a preventative for any further refills  
Refilled 1 time only, please call patient to schedule for Preventive.  
partial/upper